# Patient Record
Sex: FEMALE | Race: WHITE | NOT HISPANIC OR LATINO | Employment: FULL TIME | URBAN - METROPOLITAN AREA
[De-identification: names, ages, dates, MRNs, and addresses within clinical notes are randomized per-mention and may not be internally consistent; named-entity substitution may affect disease eponyms.]

---

## 2024-01-02 DIAGNOSIS — Z12.11 COLON CANCER SCREENING: ICD-10-CM

## 2024-01-02 RX ORDER — POLYETHYLENE GLYCOL 3350, SODIUM SULFATE ANHYDROUS, SODIUM BICARBONATE, SODIUM CHLORIDE, POTASSIUM CHLORIDE 236; 22.74; 6.74; 5.86; 2.97 G/4L; G/4L; G/4L; G/4L; G/4L
4000 POWDER, FOR SOLUTION ORAL ONCE
Qty: 4000 ML | Refills: 0 | Status: SHIPPED | OUTPATIENT
Start: 2024-01-02 | End: 2024-01-02

## 2024-02-12 ENCOUNTER — OFFICE VISIT (OUTPATIENT)
Dept: GASTROENTEROLOGY | Facility: EXTERNAL LOCATION | Age: 49
End: 2024-02-12
Payer: COMMERCIAL

## 2024-02-12 DIAGNOSIS — Z85.038 PERSONAL HISTORY OF COLON CANCER: Primary | ICD-10-CM

## 2024-02-12 DIAGNOSIS — Z86.010 PERSONAL HISTORY OF COLONIC POLYPS: ICD-10-CM

## 2024-02-12 PROCEDURE — 45378 DIAGNOSTIC COLONOSCOPY: CPT | Performed by: INTERNAL MEDICINE

## 2024-08-19 ENCOUNTER — APPOINTMENT (OUTPATIENT)
Dept: PRIMARY CARE | Facility: CLINIC | Age: 49
End: 2024-08-19
Payer: COMMERCIAL

## 2024-08-19 VITALS
SYSTOLIC BLOOD PRESSURE: 120 MMHG | WEIGHT: 136 LBS | OXYGEN SATURATION: 100 % | HEART RATE: 90 BPM | BODY MASS INDEX: 23.71 KG/M2 | DIASTOLIC BLOOD PRESSURE: 80 MMHG

## 2024-08-19 DIAGNOSIS — C18.9 MALIGNANT NEOPLASM OF COLON, UNSPECIFIED PART OF COLON (MULTI): ICD-10-CM

## 2024-08-19 DIAGNOSIS — M25.552 ACUTE HIP PAIN, LEFT: ICD-10-CM

## 2024-08-19 DIAGNOSIS — Z12.31 VISIT FOR SCREENING MAMMOGRAM: ICD-10-CM

## 2024-08-19 DIAGNOSIS — R10.2 PELVIC PAIN: Primary | ICD-10-CM

## 2024-08-19 DIAGNOSIS — Z12.31 VISIT FOR SCREENING MAMMOGRAM: Primary | ICD-10-CM

## 2024-08-19 LAB
POC APPEARANCE, URINE: CLEAR
POC BILIRUBIN, URINE: NEGATIVE
POC BLOOD, URINE: NEGATIVE
POC COLOR, URINE: YELLOW
POC GLUCOSE, URINE: NEGATIVE MG/DL
POC KETONES, URINE: NEGATIVE MG/DL
POC LEUKOCYTES, URINE: NEGATIVE
POC NITRITE,URINE: NEGATIVE
POC PH, URINE: 7 PH
POC PROTEIN, URINE: NEGATIVE MG/DL
POC SPECIFIC GRAVITY, URINE: 1.01
POC UROBILINOGEN, URINE: 0.2 EU/DL

## 2024-08-19 PROCEDURE — 1036F TOBACCO NON-USER: CPT | Performed by: FAMILY MEDICINE

## 2024-08-19 PROCEDURE — 81002 URINALYSIS NONAUTO W/O SCOPE: CPT | Performed by: FAMILY MEDICINE

## 2024-08-19 PROCEDURE — 99214 OFFICE O/P EST MOD 30 MIN: CPT | Performed by: FAMILY MEDICINE

## 2024-08-19 RX ORDER — PREDNISONE 20 MG/1
TABLET ORAL
Qty: 9 TABLET | Refills: 0 | Status: SHIPPED | OUTPATIENT
Start: 2024-08-19 | End: 2024-08-25

## 2024-08-19 NOTE — PROGRESS NOTES
Subjective   Patient ID: Aminta Alba is a 49 y.o. female who presents for Hip Pain (Left hip pain since July-- no injury consistent pain, tried tylenol but did not help. ).    HPI    LEFT HIP PAIN:  Started early July (7/8)  Felt like her menstrual cycle was coming but never came.  Persistent achiness in her back and feeling deep into her left pelvis.  Pain comes and goes and is sharp at times.  No changes in bowel pattern or urinary symptoms.    Tried stretches and seemed to aggravate it more  Does not wake her from sleep but hurst when she gets up   No acute trauma    Persistent achiness but not reproducible.    PMH: colon ca  Reviewed previous CT scans with left sided ovarian/adnexal cysts    Review of Systems    Review of Systems negative except as noted in HPI and Chief complaint.     Objective             VITALS:  /80 (BP Location: Left arm, Patient Position: Sitting, BP Cuff Size: Adult)   Pulse 90   Wt 61.7 kg (136 lb)   SpO2 100%   BMI 23.71 kg/m²      Physical Exam  Constitutional:       General: She is not in acute distress.     Appearance: Normal appearance. She is not ill-appearing.   HENT:      Head: Normocephalic and atraumatic.   Neck:      Vascular: No carotid bruit.   Cardiovascular:      Rate and Rhythm: Normal rate and regular rhythm.      Pulses: Normal pulses.      Heart sounds: Normal heart sounds. No murmur heard.     No gallop.   Pulmonary:      Effort: Pulmonary effort is normal.      Breath sounds: Normal breath sounds. No wheezing, rhonchi or rales.   Musculoskeletal:         General: No swelling or tenderness. Normal range of motion.      Cervical back: Normal range of motion and neck supple. No rigidity or tenderness.      Comments: No paraspinal muscle tightness or tenderness  No pain with left hip flexion, extension or rotation.   Lymphadenopathy:      Cervical: No cervical adenopathy.   Skin:     General: Skin is warm and dry.   Neurological:      Mental Status: She is  alert.   Psychiatric:         Mood and Affect: Mood normal.         Behavior: Behavior normal.         Assessment/Plan   Problem List Items Addressed This Visit       Malignant neoplasm of colon, unspecified part of colon (Multi)     Stable- follow  up with GI for repeat colonoscopy as scheduled.  Call if any changes in bowel patterns or abdominal pain.          Other Visit Diagnoses       Pelvic pain    -  Primary    Checking pelvic US for possible persistent ovarian cyst.    Relevant Orders    US pelvis    POCT UA (nonautomated) manually resulted (Completed)    Acute hip pain, left        Trial of Prednisone taper for possible muscular inflammation.    Relevant Medications    predniSONE (Deltasone) 20 mg tablet    Visit for screening mammogram        Relevant Orders    BI mammo bilateral screening tomosynthesis        UA negative for hematuria - kidney stone less likely - consdier CT abdomen and pelvis if US negative.    FOLLOW UP  pending review of results , SOONER WITH ANY PROBLEMS OR CONCERNS.

## 2024-08-20 NOTE — ASSESSMENT & PLAN NOTE
Stable- follow  up with GI for repeat colonoscopy as scheduled.  Call if any changes in bowel patterns or abdominal pain.

## 2024-08-21 ENCOUNTER — HOSPITAL ENCOUNTER (OUTPATIENT)
Dept: RADIOLOGY | Facility: CLINIC | Age: 49
Discharge: HOME | End: 2024-08-21
Payer: COMMERCIAL

## 2024-08-21 DIAGNOSIS — R10.2 PELVIC AND PERINEAL PAIN: ICD-10-CM

## 2024-08-21 DIAGNOSIS — R10.2 PELVIC PAIN: ICD-10-CM

## 2024-08-21 PROCEDURE — 76856 US EXAM PELVIC COMPLETE: CPT

## 2024-08-21 PROCEDURE — 76830 TRANSVAGINAL US NON-OB: CPT

## 2024-08-22 ENCOUNTER — HOSPITAL ENCOUNTER (OUTPATIENT)
Dept: RADIOLOGY | Facility: CLINIC | Age: 49
Discharge: HOME | End: 2024-08-22
Payer: COMMERCIAL

## 2024-08-22 VITALS — HEIGHT: 64 IN | BODY MASS INDEX: 23.05 KG/M2 | WEIGHT: 135 LBS

## 2024-08-22 DIAGNOSIS — Z12.31 ENCOUNTER FOR SCREENING MAMMOGRAM FOR MALIGNANT NEOPLASM OF BREAST: ICD-10-CM

## 2024-08-22 DIAGNOSIS — Z12.31 VISIT FOR SCREENING MAMMOGRAM: ICD-10-CM

## 2024-08-22 PROCEDURE — 77067 SCR MAMMO BI INCL CAD: CPT

## 2024-08-26 ENCOUNTER — TELEPHONE (OUTPATIENT)
Dept: PRIMARY CARE | Facility: CLINIC | Age: 49
End: 2024-08-26
Payer: COMMERCIAL

## 2024-08-26 ENCOUNTER — PATIENT MESSAGE (OUTPATIENT)
Dept: PRIMARY CARE | Facility: CLINIC | Age: 49
End: 2024-08-26
Payer: COMMERCIAL

## 2024-08-26 DIAGNOSIS — M25.552 ACUTE HIP PAIN, LEFT: ICD-10-CM

## 2024-08-26 DIAGNOSIS — R10.2 PELVIC PAIN: Primary | ICD-10-CM

## 2024-08-26 NOTE — TELEPHONE ENCOUNTER
PT CALLED TODAY STATING SHE SAW DR CARPENTER ON 8.19.24 FOR PELVIC PAIN, SHE PRESCRIBED HER PREDNISONE AND AN ULTRASOUND. SHE SAID THE PAIN IS STILL THERE AND SHE JUST ISNT SURE WHAT TO DO TO HELP? PLEASE ADVISE.

## 2024-08-27 RX ORDER — TIZANIDINE 2 MG/1
2 TABLET ORAL EVERY 8 HOURS PRN
Qty: 30 TABLET | Refills: 0 | Status: SHIPPED | OUTPATIENT
Start: 2024-08-27 | End: 2024-09-06

## 2024-08-30 ENCOUNTER — APPOINTMENT (OUTPATIENT)
Dept: PRIMARY CARE | Facility: CLINIC | Age: 49
End: 2024-08-30
Payer: COMMERCIAL

## 2024-09-10 ENCOUNTER — HOSPITAL ENCOUNTER (OUTPATIENT)
Dept: RADIOLOGY | Facility: CLINIC | Age: 49
Discharge: HOME | End: 2024-09-10
Payer: COMMERCIAL

## 2024-09-10 DIAGNOSIS — R10.2 PELVIC PAIN: ICD-10-CM

## 2024-09-10 PROCEDURE — 74177 CT ABD & PELVIS W/CONTRAST: CPT | Performed by: RADIOLOGY

## 2024-09-10 PROCEDURE — 74177 CT ABD & PELVIS W/CONTRAST: CPT

## 2024-09-10 PROCEDURE — 2550000001 HC RX 255 CONTRASTS: Performed by: FAMILY MEDICINE

## 2024-09-18 ENCOUNTER — HOSPITAL ENCOUNTER (OUTPATIENT)
Dept: RADIOLOGY | Facility: CLINIC | Age: 49
Discharge: HOME | End: 2024-09-18
Payer: COMMERCIAL

## 2024-09-18 ENCOUNTER — APPOINTMENT (OUTPATIENT)
Dept: PRIMARY CARE | Facility: CLINIC | Age: 49
End: 2024-09-18
Payer: COMMERCIAL

## 2024-09-18 VITALS
SYSTOLIC BLOOD PRESSURE: 98 MMHG | OXYGEN SATURATION: 98 % | HEART RATE: 83 BPM | DIASTOLIC BLOOD PRESSURE: 64 MMHG | WEIGHT: 133 LBS | BODY MASS INDEX: 23.19 KG/M2

## 2024-09-18 DIAGNOSIS — N83.8 OVARIAN MASS, LEFT: ICD-10-CM

## 2024-09-18 DIAGNOSIS — M25.552 ACUTE HIP PAIN, LEFT: ICD-10-CM

## 2024-09-18 DIAGNOSIS — R10.2 PELVIC PAIN: Primary | ICD-10-CM

## 2024-09-18 DIAGNOSIS — R10.2 PELVIC PAIN: ICD-10-CM

## 2024-09-18 PROCEDURE — 99213 OFFICE O/P EST LOW 20 MIN: CPT | Performed by: FAMILY MEDICINE

## 2024-09-18 PROCEDURE — 73502 X-RAY EXAM HIP UNI 2-3 VIEWS: CPT | Mod: LEFT SIDE | Performed by: RADIOLOGY

## 2024-09-18 PROCEDURE — 1036F TOBACCO NON-USER: CPT | Performed by: FAMILY MEDICINE

## 2024-09-18 PROCEDURE — 73502 X-RAY EXAM HIP UNI 2-3 VIEWS: CPT | Mod: LT

## 2024-09-18 RX ORDER — TIZANIDINE 4 MG/1
4 TABLET ORAL NIGHTLY
Qty: 30 TABLET | Refills: 2 | Status: SHIPPED | OUTPATIENT
Start: 2024-09-18 | End: 2024-12-17

## 2024-09-18 RX ORDER — MELOXICAM 15 MG/1
15 TABLET ORAL DAILY
Qty: 30 TABLET | Refills: 11 | Status: SHIPPED | OUTPATIENT
Start: 2024-09-18 | End: 2025-09-18

## 2024-09-18 ASSESSMENT — ENCOUNTER SYMPTOMS
DEPRESSION: 0
OCCASIONAL FEELINGS OF UNSTEADINESS: 0
LOSS OF SENSATION IN FEET: 0

## 2024-09-18 ASSESSMENT — PATIENT HEALTH QUESTIONNAIRE - PHQ9
1. LITTLE INTEREST OR PLEASURE IN DOING THINGS: NOT AT ALL
2. FEELING DOWN, DEPRESSED OR HOPELESS: NOT AT ALL
SUM OF ALL RESPONSES TO PHQ9 QUESTIONS 1 AND 2: 0

## 2024-09-18 NOTE — PROGRESS NOTES
Subjective   Patient ID: Aminta Alba is a 49 y.o. female who presents for Follow-up.    HPI     Review of Systems   All other systems reviewed and are negative.      Objective   BP 98/64   Pulse 83   Wt 60.3 kg (133 lb)   LMP 08/04/2024   SpO2 98%   BMI 23.19 kg/m²     Physical Exam  Constitutional:       Appearance: Normal appearance.   HENT:      Head: Normocephalic and atraumatic.      Right Ear: Tympanic membrane, ear canal and external ear normal.      Left Ear: Tympanic membrane, ear canal and external ear normal.      Nose: Nose normal.      Mouth/Throat:      Mouth: Mucous membranes are moist.      Pharynx: Oropharynx is clear.   Eyes:      Extraocular Movements: Extraocular movements intact.      Conjunctiva/sclera: Conjunctivae normal.      Pupils: Pupils are equal, round, and reactive to light.   Cardiovascular:      Rate and Rhythm: Normal rate and regular rhythm.      Pulses: Normal pulses.      Heart sounds: Normal heart sounds.   Pulmonary:      Effort: Pulmonary effort is normal.      Breath sounds: Normal breath sounds.   Abdominal:      General: Abdomen is flat. Bowel sounds are normal.      Palpations: Abdomen is soft.   Musculoskeletal:         General: Normal range of motion.      Cervical back: Normal range of motion and neck supple.   Skin:     General: Skin is warm and dry.      Capillary Refill: Capillary refill takes less than 2 seconds.   Neurological:      General: No focal deficit present.      Mental Status: She is alert and oriented to person, place, and time.   Psychiatric:         Mood and Affect: Mood normal.         Behavior: Behavior normal.         Thought Content: Thought content normal.         Assessment/Plan   Diagnoses and all orders for this visit:  Pelvic pain  -     Referral to Obstetrics / Gynecology; Future  -     XR hip left with pelvis when performed 2 or 3 views; Future  -     TSH with reflex to Free T4 if abnormal; Future  -     Lipid Panel; Future  -      Comprehensive Metabolic Panel; Future  -     CBC and Auto Differential; Future  -     meloxicam (Mobic) 15 mg tablet; Take 1 tablet (15 mg) by mouth once daily.  Ovarian mass, left  -     ; Future  -     meloxicam (Mobic) 15 mg tablet; Take 1 tablet (15 mg) by mouth once daily.  Acute hip pain, left  -     tiZANidine (Zanaflex) 4 mg tablet; Take 1 tablet (4 mg) by mouth once daily at bedtime.

## 2024-09-19 ENCOUNTER — LAB (OUTPATIENT)
Dept: LAB | Facility: LAB | Age: 49
End: 2024-09-19
Payer: COMMERCIAL

## 2024-09-19 ENCOUNTER — TELEPHONE (OUTPATIENT)
Dept: OBSTETRICS AND GYNECOLOGY | Facility: CLINIC | Age: 49
End: 2024-09-19

## 2024-09-19 DIAGNOSIS — N83.8 OVARIAN MASS, LEFT: ICD-10-CM

## 2024-09-19 DIAGNOSIS — R10.2 PELVIC PAIN: ICD-10-CM

## 2024-09-19 LAB
ALBUMIN SERPL BCP-MCNC: 4.5 G/DL (ref 3.4–5)
ALP SERPL-CCNC: 35 U/L (ref 33–110)
ALT SERPL W P-5'-P-CCNC: 12 U/L (ref 7–45)
ANION GAP SERPL CALC-SCNC: 10 MMOL/L (ref 10–20)
AST SERPL W P-5'-P-CCNC: 14 U/L (ref 9–39)
BASOPHILS # BLD AUTO: 0.03 X10*3/UL (ref 0–0.1)
BASOPHILS NFR BLD AUTO: 0.8 %
BILIRUB SERPL-MCNC: 1.6 MG/DL (ref 0–1.2)
BUN SERPL-MCNC: 11 MG/DL (ref 6–23)
CALCIUM SERPL-MCNC: 9.8 MG/DL (ref 8.6–10.6)
CANCER AG125 SERPL-ACNC: 12 U/ML (ref 0–30.2)
CHLORIDE SERPL-SCNC: 104 MMOL/L (ref 98–107)
CHOLEST SERPL-MCNC: 206 MG/DL (ref 0–199)
CHOLESTEROL/HDL RATIO: 2.6
CO2 SERPL-SCNC: 26 MMOL/L (ref 21–32)
CREAT SERPL-MCNC: 0.86 MG/DL (ref 0.5–1.05)
EGFRCR SERPLBLD CKD-EPI 2021: 83 ML/MIN/1.73M*2
EOSINOPHIL # BLD AUTO: 0.03 X10*3/UL (ref 0–0.7)
EOSINOPHIL NFR BLD AUTO: 0.8 %
ERYTHROCYTE [DISTWIDTH] IN BLOOD BY AUTOMATED COUNT: 12.4 % (ref 11.5–14.5)
GLUCOSE SERPL-MCNC: 95 MG/DL (ref 74–99)
HCT VFR BLD AUTO: 38.3 % (ref 36–46)
HDLC SERPL-MCNC: 78.1 MG/DL
HGB BLD-MCNC: 12.4 G/DL (ref 12–16)
IMM GRANULOCYTES # BLD AUTO: 0.01 X10*3/UL (ref 0–0.7)
IMM GRANULOCYTES NFR BLD AUTO: 0.3 % (ref 0–0.9)
LDLC SERPL CALC-MCNC: 118 MG/DL
LYMPHOCYTES # BLD AUTO: 1.53 X10*3/UL (ref 1.2–4.8)
LYMPHOCYTES NFR BLD AUTO: 40.1 %
MCH RBC QN AUTO: 30.4 PG (ref 26–34)
MCHC RBC AUTO-ENTMCNC: 32.4 G/DL (ref 32–36)
MCV RBC AUTO: 94 FL (ref 80–100)
MONOCYTES # BLD AUTO: 0.3 X10*3/UL (ref 0.1–1)
MONOCYTES NFR BLD AUTO: 7.9 %
NEUTROPHILS # BLD AUTO: 1.92 X10*3/UL (ref 1.2–7.7)
NEUTROPHILS NFR BLD AUTO: 50.1 %
NON HDL CHOLESTEROL: 128 MG/DL (ref 0–149)
NRBC BLD-RTO: 0 /100 WBCS (ref 0–0)
PLATELET # BLD AUTO: 241 X10*3/UL (ref 150–450)
POTASSIUM SERPL-SCNC: 4 MMOL/L (ref 3.5–5.3)
PROT SERPL-MCNC: 7 G/DL (ref 6.4–8.2)
RBC # BLD AUTO: 4.08 X10*6/UL (ref 4–5.2)
SODIUM SERPL-SCNC: 136 MMOL/L (ref 136–145)
TRIGL SERPL-MCNC: 49 MG/DL (ref 0–149)
TSH SERPL-ACNC: 2.01 MIU/L (ref 0.44–3.98)
VLDL: 10 MG/DL (ref 0–40)
WBC # BLD AUTO: 3.8 X10*3/UL (ref 4.4–11.3)

## 2024-09-19 PROCEDURE — 85025 COMPLETE CBC W/AUTO DIFF WBC: CPT

## 2024-09-19 PROCEDURE — 80061 LIPID PANEL: CPT

## 2024-09-19 PROCEDURE — 84443 ASSAY THYROID STIM HORMONE: CPT

## 2024-09-19 PROCEDURE — 80053 COMPREHEN METABOLIC PANEL: CPT

## 2024-09-19 PROCEDURE — 36415 COLL VENOUS BLD VENIPUNCTURE: CPT

## 2024-09-19 PROCEDURE — 86304 IMMUNOASSAY TUMOR CA 125: CPT

## 2024-09-19 NOTE — TELEPHONE ENCOUNTER
Patient called stating that she has pelvic pain and ovarian cysts.  Was referred to [facility] via Dr. Ashlyn Geller.  Please advise,

## 2024-09-30 ENCOUNTER — APPOINTMENT (OUTPATIENT)
Dept: PRIMARY CARE | Facility: CLINIC | Age: 49
End: 2024-09-30
Payer: COMMERCIAL

## 2024-10-04 PROBLEM — N83.8 COMPLEX CYST OF UTERINE ADNEXA: Status: ACTIVE | Noted: 2024-10-04

## 2024-10-04 PROBLEM — N94.89 ENDOMETRIAL MASS: Status: ACTIVE | Noted: 2024-10-04

## 2024-10-04 PROBLEM — R10.2 PELVIC PAIN IN FEMALE: Status: ACTIVE | Noted: 2024-10-04

## 2024-10-05 NOTE — PROGRESS NOTES
Subjective   Patient ID: Aminta Alba is a 49 y.o. female who presents for New Patient Visit (Lower abdominal pain x months).  She presents as a new patient for gyn visit. She was referred by Dr. Olsen for pelvic pain. Last two progress notes were reviewed in preparation for today's visit.   8/12/2020 pap and HPV returned negative. She desires annual exam along  with evaluation of pelvic pain and imaging.    She has had complaint of a deep pain in the left hip/pelvis since July 2024. She describes this as a deep aching similar to what she has associated with menses. This pain comes and goes and can be sharp at times. There is no associated bowel or bladder issues. This pain can be exacerbated by stretching and seems to be worse when getting up from bed. She had pain with bending, sitting, activity. The pain reminded her of pain/cramping with menses but was clearly related to activity. The initial pain lasted for one month.  Initial thought was that this could be musculoskeletal in origin. She was prescribed a course of muscle relaxer and prednisone with no improvement in the pain. The pain has gradually improved since July but she still feels something is not right. There continues to be a dull discomfort and sensation of something pressing on her hip/pelvis. It is not as related to activity now.    Menses are monthly with heavy flow and with clots. Tranexamic acid has helped with menses. She has not noted any relation of pain with menses. She denies any recent injury or fall. She denies pain with intercourse. She denies any relation of pain to using the bathroom or eating.     Pelvic ultrasound was performed on 8/21/2024 and was significant for an endometrial mass and a 2.2 cm left adnexal complex cyst:  UTERUS:  The uterus measures  6.8 x 5.7 x 9.8. Normal myometrium.  ENDOMETRIUM:  Endometrial thickness 1.2 cm. Endometrial hypoechoic nodule slightly disrupting the endometrial stripe measuring 1.1 cm.  RIGHT  ADNEXA:  The right ovary measures 1.6 x 2.0 x 2.4 and demonstrates normal flow. No gross right adnexal masses are seen, no hydrosalpinx.  LEFT ADNEXA:  The left ovary measures 3.0 x 1.6 x 2.9 and demonstrates normal flow. Complex follicle/corpus luteum measuring 2.2 cm    CT was performed on 9/21/2024:  IMPRESSION:  1. No acute abdominopelvic pathology.  2. Redemonstration of rounded likely either submucosal fibroid versus polyp extending superiorly from the inferior aspect of the myometrium into the endometrial cavity. Similar uterine and bilateral adnexal heterogeneity with multiple adnexal cysts which, all of which are better appreciated on recent ultrasound of the pelvis from 08/21/2024.    Within the body of the CT report the radiologist also made note of dilated pelvic vasculature.    Xray of the hip was performed on 9/18/2024 and returned unremarkable.   CBC returned without anemia. CA-125 returned normal at 12.0.      Abdominal Pain  This is a chronic problem. The current episode started more than 1 month ago. The onset quality is sudden. The problem occurs daily. The most recent episode lasted 4 months. The problem has been waxing and waning. The pain is located in the left flank. The pain is at a severity of 3/10. The quality of the pain is aching, dull and a sensation of fullness. The abdominal pain radiates to the pelvis. Associated symptoms include myalgias. Pertinent negatives include no anorexia, arthralgias, belching, constipation, diarrhea, dysuria, fever, flatus, frequency, headaches, hematochezia, hematuria, melena, nausea, vomiting or weight loss. The pain is aggravated by being still, certain positions and movement. The pain is relieved by Activity and movement. Prior diagnostic workup includes CT scan and ultrasound.         Review of Systems   Constitutional:  Negative for activity change, fever and weight loss.   HENT:  Negative for congestion.    Respiratory:  Negative for apnea and cough.     Cardiovascular:  Negative for chest pain.   Gastrointestinal:  Positive for abdominal pain. Negative for anorexia, constipation, diarrhea, flatus, hematochezia, melena, nausea and vomiting.   Genitourinary:  Negative for dysuria, frequency, hematuria and vaginal pain.   Musculoskeletal:  Positive for myalgias. Negative for arthralgias and joint swelling.   Neurological:  Negative for dizziness and headaches.   Psychiatric/Behavioral:  Negative for agitation.        Past Medical History:   Diagnosis Date    Anemia     Malignant neoplasm of ascending colon (Multi) 02/06/2017    Malignant neoplasm of ascending colon      Past Surgical History:   Procedure Laterality Date    APPENDECTOMY  02/02/2015    Appendectomy    DILATION AND CURETTAGE OF UTERUS  02/02/2015    Dilation And Curettage    OTHER SURGICAL HISTORY  04/13/2016    Laparo Part Colectomy W/ Removal Terminal Ileum, Ileocolost    TONSILLECTOMY  02/02/2015    Tonsillectomy      No Known Allergies   Current Outpatient Medications on File Prior to Visit   Medication Sig Dispense Refill    meloxicam (Mobic) 15 mg tablet Take 1 tablet (15 mg) by mouth once daily. 30 tablet 11    tiZANidine (Zanaflex) 4 mg tablet Take 1 tablet (4 mg) by mouth once daily at bedtime. 30 tablet 2    cetirizine (ZyrTEC) 10 mg tablet Take 1 tablet (10 mg) by mouth once daily.       No current facility-administered medications on file prior to visit.        Objective   Physical Exam  Constitutional:       Appearance: Normal appearance.   Neck:      Thyroid: No thyromegaly.   Cardiovascular:      Rate and Rhythm: Normal rate and regular rhythm.      Heart sounds: Normal heart sounds.   Pulmonary:      Effort: Pulmonary effort is normal.      Breath sounds: Normal breath sounds.   Chest:      Chest wall: No mass.   Breasts:     Right: Normal. No inverted nipple, mass, nipple discharge or skin change.      Left: Normal. No inverted nipple, mass, nipple discharge or skin change.    Abdominal:      General: There is no distension.      Palpations: Abdomen is soft. There is no mass.      Tenderness: There is no abdominal tenderness.   Genitourinary:     General: Normal vulva.      Exam position: Lithotomy position.      Labia:         Right: No rash.         Left: No rash.       Urethra: No urethral lesion.      Vagina: Normal. No lesions.      Cervix: No friability or lesion.      Uterus: Normal. Not enlarged and not tender.       Adnexa: Right adnexa normal and left adnexa normal.        Right: No mass or tenderness.          Left: No mass or tenderness.        Comments: No pelvic tenderness is noted on exam. Pain is not reproduced with palpation of pelvic floor muscles.   Musculoskeletal:         General: No deformity.      Cervical back: Neck supple.   Lymphadenopathy:      Cervical: No cervical adenopathy.   Skin:     General: Skin is warm and dry.      Findings: No rash.   Neurological:      General: No focal deficit present.      Mental Status: She is alert.   Psychiatric:         Mood and Affect: Mood normal.         Behavior: Behavior is cooperative.         Thought Content: Thought content normal.           Problem List Items Addressed This Visit       Well woman exam with routine gynecological exam    Overview     8/12/2020 pap and HPV were negative.          Current Assessment & Plan     Pap is sent with HPV.  Mammogram is up to date.  Regular exercise and attaining/maintaining a healthy weight is encouraged.   Adequate calcium intake with diet or supplements is encouraged.    We will notify of any abnormal results.          Pelvic pain in female - Primary    Overview     Left deep pelvic pain since 7/2024.   Pelvic ultrasound 8/21/2024 returned with normal sized uterus without evidence of fibroids, endometrial thickness 1.2 cm and endometrial hypoechoic nodule slightly disrupting the endometrial stripe measuring 1.1 cm. Right ovary had normal appearance and left ovary had a  complex follicle/corpus luteum measuring 2.2 cm.   CT 9/10/2024 was significant for dilated pelvic vasculature.  Pelvic pain is not reproduced with palpation of pelvic floor muscles or bimanual exam.             Current Assessment & Plan     Will repeat pelvic ultrasound to follow adnexal cyst. She will also follow up with PCP to evaluate for other causes of pain.         Menorrhagia with regular cycle    Overview     Heavy menses with clots along with endometrial mass on imaging.  She agrees to endometrial biopsy at follow up.         Current Assessment & Plan     We did briefly discuss treatment options of continuing Tranexamic acid, IUD, hysterectomy. She will consider and review at follow up.         Endometrial mass    Overview     Ultrasound was performed 8/12/2024 due to left pelvic pain. Uterus measured 6.8 x 5.7 x 9.8 cm with 1.2 cm endometrial thickness and a 1.1 cm hypoechoic endometrial lesion noted.  Endometrial biopsy is planned for at follow up visit.          Current Assessment & Plan     Will repeat ultrasound imaging and schedule endometrial biopsy. We discussed potential future need for hysteroscopy if abnormal tissue is found.          Relevant Orders    US PELVIS TRANSABDOMINAL WITH TRANSVAGINAL    Complex cyst of uterine adnexa    Overview     8/21/2024 ultrasound shows a left complex follicle/corpus luteum measuring 2.2 cm. CA-125 returned normal at 12.          Current Assessment & Plan     Follow up ultrasound is ordered to assess ovary.          Relevant Orders    US PELVIS TRANSABDOMINAL WITH TRANSVAGINAL

## 2024-10-07 ASSESSMENT — ENCOUNTER SYMPTOMS
DYSURIA: 0
FLATUS: 0
BELCHING: 0
HEMATOCHEZIA: 0
MYALGIAS: 1
FEVER: 0
ANOREXIA: 0
DIARRHEA: 0
FREQUENCY: 0
CONSTIPATION: 0
NAUSEA: 0
ARTHRALGIAS: 0
WEIGHT LOSS: 0
HEMATURIA: 0
ABDOMINAL PAIN: 1
HEADACHES: 0
VOMITING: 0

## 2024-10-09 ENCOUNTER — APPOINTMENT (OUTPATIENT)
Dept: OBSTETRICS AND GYNECOLOGY | Facility: CLINIC | Age: 49
End: 2024-10-09
Payer: COMMERCIAL

## 2024-10-09 VITALS
WEIGHT: 135 LBS | DIASTOLIC BLOOD PRESSURE: 82 MMHG | SYSTOLIC BLOOD PRESSURE: 130 MMHG | HEIGHT: 63 IN | BODY MASS INDEX: 23.92 KG/M2

## 2024-10-09 DIAGNOSIS — R10.2 PELVIC PAIN IN FEMALE: Primary | ICD-10-CM

## 2024-10-09 DIAGNOSIS — N83.8 COMPLEX CYST OF UTERINE ADNEXA: ICD-10-CM

## 2024-10-09 DIAGNOSIS — Z01.419 WELL WOMAN EXAM WITH ROUTINE GYNECOLOGICAL EXAM: ICD-10-CM

## 2024-10-09 DIAGNOSIS — N92.0 MENORRHAGIA WITH REGULAR CYCLE: ICD-10-CM

## 2024-10-09 DIAGNOSIS — N94.89 ENDOMETRIAL MASS: ICD-10-CM

## 2024-10-09 PROCEDURE — 87624 HPV HI-RISK TYP POOLED RSLT: CPT

## 2024-10-09 PROCEDURE — 99204 OFFICE O/P NEW MOD 45 MIN: CPT | Performed by: OBSTETRICS & GYNECOLOGY

## 2024-10-09 PROCEDURE — 3008F BODY MASS INDEX DOCD: CPT | Performed by: OBSTETRICS & GYNECOLOGY

## 2024-10-09 PROCEDURE — 1036F TOBACCO NON-USER: CPT | Performed by: OBSTETRICS & GYNECOLOGY

## 2024-10-09 PROCEDURE — 99386 PREV VISIT NEW AGE 40-64: CPT | Performed by: OBSTETRICS & GYNECOLOGY

## 2024-10-09 RX ORDER — CETIRIZINE HYDROCHLORIDE 10 MG/1
10 TABLET ORAL DAILY
COMMUNITY

## 2024-10-09 ASSESSMENT — ENCOUNTER SYMPTOMS
FEVER: 0
WEIGHT LOSS: 0
DYSURIA: 0
HEADACHES: 0
HEMATOCHEZIA: 0
MYALGIAS: 1
FREQUENCY: 0
FLATUS: 0
HEMATURIA: 0
AGITATION: 0
COUGH: 0
JOINT SWELLING: 0
ACTIVITY CHANGE: 0
DIARRHEA: 0
NAUSEA: 0
VOMITING: 0
ABDOMINAL PAIN: 1
APNEA: 0
ARTHRALGIAS: 0
ANOREXIA: 0
BELCHING: 0
CONSTIPATION: 0
DIZZINESS: 0

## 2024-10-09 NOTE — ASSESSMENT & PLAN NOTE
Pap is sent with HPV.  Mammogram is up to date.  Regular exercise and attaining/maintaining a healthy weight is encouraged.   Adequate calcium intake with diet or supplements is encouraged.    We will notify of any abnormal results.

## 2024-10-09 NOTE — ASSESSMENT & PLAN NOTE
Will repeat pelvic ultrasound to follow adnexal cyst. She will also follow up with PCP to evaluate for other causes of pain.

## 2024-10-09 NOTE — ASSESSMENT & PLAN NOTE
Will repeat ultrasound imaging and schedule endometrial biopsy. We discussed potential future need for hysteroscopy if abnormal tissue is found.

## 2024-10-09 NOTE — LETTER
October 9, 2024     Ashlyn Olsen DO  [address], [address]  [facility] [address]    Patient: Aminta Alba   YOB: 1975   Date of Visit: 10/9/2024       Dear Dr. Ashlyn Olsen DO:    Thank you for referring Aminta Alba to me for evaluation. Below are my notes for this consultation.  If you have questions, please do not hesitate to call me. I look forward to following your patient along with you.       Sincerely,     Anne Marie Dixon MD      CC: No Recipients  ______________________________________________________________________________________    Subjective  Patient ID: Aminta Alba is a 49 y.o. female who presents for New Patient Visit (Lower abdominal pain x months).  She presents as a new patient for gyn visit. She was referred by Dr. Olsen for pelvic pain. Last two progress notes were reviewed in preparation for today's visit.   8/12/2020 pap and HPV returned negative. She desires annual exam along  with evaluation of pelvic pain and imaging.    She has had complaint of a deep pain in the left hip/pelvis since July 2024. She describes this as a deep aching similar to what she has associated with menses. This pain comes and goes and can be sharp at times. There is no associated bowel or bladder issues. This pain can be exacerbated by stretching and seems to be worse when getting up from bed. She had pain with bending, sitting, activity. The pain reminded her of pain/cramping with menses but was clearly related to activity. The initial pain lasted for one month.  Initial thought was that this could be musculoskeletal in origin. She was prescribed a course of muscle relaxer and prednisone with no improvement in the pain. The pain has gradually improved since July but she still feels something is not right. There continues to be a dull discomfort and sensation of something pressing on her hip/pelvis. It is not as related to activity now.    Menses are monthly  with heavy flow and with clots. Tranexamic acid has helped with menses. She has not noted any relation of pain with menses. She denies any recent injury or fall. She denies pain with intercourse. She denies any relation of pain to using the bathroom or eating.     Pelvic ultrasound was performed on 8/21/2024 and was significant for an endometrial mass and a 2.2 cm left adnexal complex cyst:  UTERUS:  The uterus measures  6.8 x 5.7 x 9.8. Normal myometrium.  ENDOMETRIUM:  Endometrial thickness 1.2 cm. Endometrial hypoechoic nodule slightly disrupting the endometrial stripe measuring 1.1 cm.  RIGHT ADNEXA:  The right ovary measures 1.6 x 2.0 x 2.4 and demonstrates normal flow. No gross right adnexal masses are seen, no hydrosalpinx.  LEFT ADNEXA:  The left ovary measures 3.0 x 1.6 x 2.9 and demonstrates normal flow. Complex follicle/corpus luteum measuring 2.2 cm    CT was performed on 9/21/2024:  IMPRESSION:  1. No acute abdominopelvic pathology.  2. Redemonstration of rounded likely either submucosal fibroid versus polyp extending superiorly from the inferior aspect of the myometrium into the endometrial cavity. Similar uterine and bilateral adnexal heterogeneity with multiple adnexal cysts which, all of which are better appreciated on recent ultrasound of the pelvis from 08/21/2024.    Within the body of the CT report the radiologist also made note of dilated pelvic vasculature.    Xray of the hip was performed on 9/18/2024 and returned unremarkable.   CBC returned without anemia. CA-125 returned normal at 12.0.      Abdominal Pain  This is a chronic problem. The current episode started more than 1 month ago. The onset quality is sudden. The problem occurs daily. The most recent episode lasted 4 months. The problem has been waxing and waning. The pain is located in the left flank. The pain is at a severity of 3/10. The quality of the pain is aching, dull and a sensation of fullness. The abdominal pain radiates to  the pelvis. Associated symptoms include myalgias. Pertinent negatives include no anorexia, arthralgias, belching, constipation, diarrhea, dysuria, fever, flatus, frequency, headaches, hematochezia, hematuria, melena, nausea, vomiting or weight loss. The pain is aggravated by being still, certain positions and movement. The pain is relieved by Activity and movement. Prior diagnostic workup includes CT scan and ultrasound.         Review of Systems   Constitutional:  Negative for activity change, fever and weight loss.   HENT:  Negative for congestion.    Respiratory:  Negative for apnea and cough.    Cardiovascular:  Negative for chest pain.   Gastrointestinal:  Positive for abdominal pain. Negative for anorexia, constipation, diarrhea, flatus, hematochezia, melena, nausea and vomiting.   Genitourinary:  Negative for dysuria, frequency, hematuria and vaginal pain.   Musculoskeletal:  Positive for myalgias. Negative for arthralgias and joint swelling.   Neurological:  Negative for dizziness and headaches.   Psychiatric/Behavioral:  Negative for agitation.        Past Medical History:   Diagnosis Date   • Anemia    • Malignant neoplasm of ascending colon (Multi) 02/06/2017    Malignant neoplasm of ascending colon      Past Surgical History:   Procedure Laterality Date   • APPENDECTOMY  02/02/2015    Appendectomy   • DILATION AND CURETTAGE OF UTERUS  02/02/2015    Dilation And Curettage   • OTHER SURGICAL HISTORY  04/13/2016    Laparo Part Colectomy W/ Removal Terminal Ileum, Ileocolost   • TONSILLECTOMY  02/02/2015    Tonsillectomy      No Known Allergies   Current Outpatient Medications on File Prior to Visit   Medication Sig Dispense Refill   • meloxicam (Mobic) 15 mg tablet Take 1 tablet (15 mg) by mouth once daily. 30 tablet 11   • tiZANidine (Zanaflex) 4 mg tablet Take 1 tablet (4 mg) by mouth once daily at bedtime. 30 tablet 2   • cetirizine (ZyrTEC) 10 mg tablet Take 1 tablet (10 mg) by mouth once daily.        No current facility-administered medications on file prior to visit.        Objective  Physical Exam  Constitutional:       Appearance: Normal appearance.   Neck:      Thyroid: No thyromegaly.   Cardiovascular:      Rate and Rhythm: Normal rate and regular rhythm.      Heart sounds: Normal heart sounds.   Pulmonary:      Effort: Pulmonary effort is normal.      Breath sounds: Normal breath sounds.   Chest:      Chest wall: No mass.   Breasts:     Right: Normal. No inverted nipple, mass, nipple discharge or skin change.      Left: Normal. No inverted nipple, mass, nipple discharge or skin change.   Abdominal:      General: There is no distension.      Palpations: Abdomen is soft. There is no mass.      Tenderness: There is no abdominal tenderness.   Genitourinary:     General: Normal vulva.      Exam position: Lithotomy position.      Labia:         Right: No rash.         Left: No rash.       Urethra: No urethral lesion.      Vagina: Normal. No lesions.      Cervix: No friability or lesion.      Uterus: Normal. Not enlarged and not tender.       Adnexa: Right adnexa normal and left adnexa normal.        Right: No mass or tenderness.          Left: No mass or tenderness.        Comments: No pelvic tenderness is noted on exam. Pain is not reproduced with palpation of pelvic floor muscles.   Musculoskeletal:         General: No deformity.      Cervical back: Neck supple.   Lymphadenopathy:      Cervical: No cervical adenopathy.   Skin:     General: Skin is warm and dry.      Findings: No rash.   Neurological:      General: No focal deficit present.      Mental Status: She is alert.   Psychiatric:         Mood and Affect: Mood normal.         Behavior: Behavior is cooperative.         Thought Content: Thought content normal.           Problem List Items Addressed This Visit       Well woman exam with routine gynecological exam    Overview     8/12/2020 pap and HPV were negative.          Current Assessment & Plan      Pap is sent with HPV.  Mammogram is up to date.  Regular exercise and attaining/maintaining a healthy weight is encouraged.   Adequate calcium intake with diet or supplements is encouraged.    We will notify of any abnormal results.          Pelvic pain in female - Primary    Overview     Left deep pelvic pain since 7/2024.   Pelvic ultrasound 8/21/2024 returned with normal sized uterus without evidence of fibroids, endometrial thickness 1.2 cm and endometrial hypoechoic nodule slightly disrupting the endometrial stripe measuring 1.1 cm. Right ovary had normal appearance and left ovary had a complex follicle/corpus luteum measuring 2.2 cm.   CT 9/10/2024 was significant for dilated pelvic vasculature.  Pelvic pain is not reproduced with palpation of pelvic floor muscles or bimanual exam.             Current Assessment & Plan     Will repeat pelvic ultrasound to follow adnexal cyst. She will also follow up with PCP to evaluate for other causes of pain.         Menorrhagia with regular cycle    Overview     Heavy menses with clots along with endometrial mass on imaging.  She agrees to endometrial biopsy at follow up.         Current Assessment & Plan     We did briefly discuss treatment options of continuing Tranexamic acid, IUD, hysterectomy. She will consider and review at follow up.         Endometrial mass    Overview     Ultrasound was performed 8/12/2024 due to left pelvic pain. Uterus measured 6.8 x 5.7 x 9.8 cm with 1.2 cm endometrial thickness and a 1.1 cm hypoechoic endometrial lesion noted.  Endometrial biopsy is planned for at follow up visit.          Current Assessment & Plan     Will repeat ultrasound imaging and schedule endometrial biopsy. We discussed potential future need for hysteroscopy if abnormal tissue is found.          Relevant Orders    US PELVIS TRANSABDOMINAL WITH TRANSVAGINAL    Complex cyst of uterine adnexa    Overview     8/21/2024 ultrasound shows a left complex follicle/corpus  luteum measuring 2.2 cm. CA-125 returned normal at 12.          Current Assessment & Plan     Follow up ultrasound is ordered to assess ovary.          Relevant Orders    US PELVIS TRANSABDOMINAL WITH TRANSVAGINAL

## 2024-10-09 NOTE — ASSESSMENT & PLAN NOTE
We did briefly discuss treatment options of continuing Tranexamic acid, IUD, hysterectomy. She will consider and review at follow up.

## 2024-10-15 ENCOUNTER — HOSPITAL ENCOUNTER (OUTPATIENT)
Dept: RADIOLOGY | Facility: CLINIC | Age: 49
Discharge: HOME | End: 2024-10-15
Payer: COMMERCIAL

## 2024-10-15 DIAGNOSIS — N83.8 COMPLEX CYST OF UTERINE ADNEXA: ICD-10-CM

## 2024-10-15 DIAGNOSIS — N94.89 ENDOMETRIAL MASS: ICD-10-CM

## 2024-10-15 LAB
CYTOLOGY CMNT CVX/VAG CYTO-IMP: NORMAL
HPV HR 12 DNA GENITAL QL NAA+PROBE: NEGATIVE
HPV HR GENOTYPES PNL CVX NAA+PROBE: NEGATIVE
HPV16 DNA SPEC QL NAA+PROBE: NEGATIVE
HPV18 DNA SPEC QL NAA+PROBE: NEGATIVE
LAB AP HPV GENOTYPE QUESTION: YES
LAB AP HPV HR: NORMAL
LABORATORY COMMENT REPORT: NORMAL
LABORATORY COMMENT REPORT: NORMAL
LMP START DATE: NORMAL
PATH REPORT.TOTAL CANCER: NORMAL

## 2024-10-15 PROCEDURE — 76856 US EXAM PELVIC COMPLETE: CPT | Performed by: STUDENT IN AN ORGANIZED HEALTH CARE EDUCATION/TRAINING PROGRAM

## 2024-10-15 PROCEDURE — 76830 TRANSVAGINAL US NON-OB: CPT

## 2024-10-15 PROCEDURE — 76830 TRANSVAGINAL US NON-OB: CPT | Performed by: STUDENT IN AN ORGANIZED HEALTH CARE EDUCATION/TRAINING PROGRAM

## 2024-10-16 ASSESSMENT — ENCOUNTER SYMPTOMS
WEIGHT LOSS: 0
DIARRHEA: 0
HEADACHES: 0
HEMATOCHEZIA: 0
ANOREXIA: 0
ABDOMINAL PAIN: 1
HEMATURIA: 0
FEVER: 0
ARTHRALGIAS: 0
BELCHING: 0
FLATUS: 0
MYALGIAS: 1
NAUSEA: 0
VOMITING: 0
DYSURIA: 0
CONSTIPATION: 0
FREQUENCY: 0

## 2024-10-16 NOTE — PROGRESS NOTES
Patient ID: Aminta Alba is a 49 y.o. female.    Endometrial biopsy    Date/Time: 11/14/2024 9:03 AM    Performed by: Anne Marie Dixon MD  Authorized by: Anne Marie Dixon MD    Consent:     Consent obtained: written    Consent given by: patient    Risks discussed: bleeding, infection and pain    Alternatives discussed: observation  Indications:     Indications: thickened endometrium    Procedure:     A bimanual exam was performed: yes      Uterus size: 6-8 weeks    Uterus position: anteverted    Prepped with: Betadine    Tenaculum used: yes      A local block was performed: no      Cervix dilated: no      Number of passes: 1  Findings:     Cervix: normal      Uterus depth by sound (cm): 8    Specimen collected: specimen collected and sent to pathology      Patient tolerance: tolerated well, no immediate complications  Subjective   Patient ID: Aminta Alba is a 49 y.o. female who presents for Endometrial Biopsy .  10/9/2024 documentation:  She presents as a new patient for gyn visit. She was referred by Dr. Olsen for pelvic pain. Last two progress notes were reviewed in preparation for today's visit.   8/12/2020 pap and HPV returned negative. She desires annual exam along  with evaluation of pelvic pain and imaging.    She has had complaint of a deep pain in the left hip/pelvis since July 2024. She describes this as a deep aching similar to what she has associated with menses. This pain comes and goes and can be sharp at times. There is no associated bowel or bladder issues. This pain can be exacerbated by stretching and seems to be worse when getting up from bed. She had pain with bending, sitting, activity. The pain reminded her of pain/cramping with menses but was clearly related to activity. The initial pain lasted for one month.  Initial thought was that this could be musculoskeletal in origin. She was prescribed a course of muscle relaxer and prednisone with no improvement in the pain. The pain has  gradually improved since July but she still feels something is not right. There continues to be a dull discomfort and sensation of something pressing on her hip/pelvis. It is not as related to activity now.    Menses are monthly with heavy flow and with clots. Tranexamic acid has helped with menses. She has not noted any relation of pain with menses. She denies any recent injury or fall. She denies pain with intercourse. She denies any relation of pain to using the bathroom or eating.     Pelvic ultrasound was performed on 8/21/2024 and was significant for an endometrial mass and a 2.2 cm left adnexal complex cyst:  UTERUS:  The uterus measures  6.8 x 5.7 x 9.8. Normal myometrium.  ENDOMETRIUM:  Endometrial thickness 1.2 cm. Endometrial hypoechoic nodule slightly disrupting the endometrial stripe measuring 1.1 cm.  RIGHT ADNEXA:  The right ovary measures 1.6 x 2.0 x 2.4 and demonstrates normal flow. No gross right adnexal masses are seen, no hydrosalpinx.  LEFT ADNEXA:  The left ovary measures 3.0 x 1.6 x 2.9 and demonstrates normal flow. Complex follicle/corpus luteum measuring 2.2 cm    CT was performed on 9/21/2024:  IMPRESSION:  1. No acute abdominopelvic pathology.  2. Redemonstration of rounded likely either submucosal fibroid versus polyp extending superiorly from the inferior aspect of the myometrium into the endometrial cavity. Similar uterine and bilateral adnexal heterogeneity with multiple adnexal cysts which, all of which are better appreciated on recent ultrasound of the pelvis from 08/21/2024.    Within the body of the CT report the radiologist also made note of dilated pelvic vasculature.    Xray of the hip was performed on 9/18/2024 and returned unremarkable.   CBC returned without anemia. CA-125 returned normal at 12.0.    11/14/2024:  At last visit she declined endometrial biopsy but is willling to perform this today.   Follow up pelvic ultrasound was performed on 10/16/2024. This shows resolution  of the adnexal cyst with normal appearance of both ovaries. The uterus was also unchanged with note of a stable appearing 1.2 cm hyperechoic nodule and a 1.1 cm endometrial thickness.       Abdominal Pain  This is a chronic problem. The current episode started more than 1 month ago. The onset quality is sudden. The problem occurs daily. The most recent episode lasted 4 months. The problem has been waxing and waning. The pain is located in the left flank. The pain is at a severity of 3/10. The quality of the pain is aching, dull and a sensation of fullness. The abdominal pain radiates to the pelvis. Associated symptoms include myalgias. Pertinent negatives include no anorexia, arthralgias, belching, constipation, diarrhea, dysuria, fever, flatus, frequency, headaches, hematochezia, hematuria, melena, nausea, vomiting or weight loss. The pain is aggravated by being still, certain positions and movement. The pain is relieved by Activity and movement. Prior diagnostic workup includes CT scan and ultrasound.         Review of Systems   Constitutional:  Negative for fever and weight loss.   Gastrointestinal:  Positive for abdominal pain. Negative for anorexia, constipation, diarrhea, flatus, hematochezia, melena, nausea and vomiting.   Genitourinary:  Negative for dysuria, frequency and hematuria.   Musculoskeletal:  Positive for myalgias. Negative for arthralgias.   Neurological:  Negative for headaches.       Past Medical History:   Diagnosis Date    Anemia     Malignant neoplasm of ascending colon (Multi) 02/06/2017    Malignant neoplasm of ascending colon      Past Surgical History:   Procedure Laterality Date    APPENDECTOMY  02/02/2015    Appendectomy    DILATION AND CURETTAGE OF UTERUS  02/02/2015    Dilation And Curettage    OTHER SURGICAL HISTORY  04/13/2016    Laparo Part Colectomy W/ Removal Terminal Ileum, Ileocolost    TONSILLECTOMY  02/02/2015    Tonsillectomy      No Known Allergies   Current Outpatient  Medications on File Prior to Visit   Medication Sig Dispense Refill    cetirizine (ZyrTEC) 10 mg tablet Take 1 tablet (10 mg) by mouth once daily.      [DISCONTINUED] meloxicam (Mobic) 15 mg tablet Take 1 tablet (15 mg) by mouth once daily. 30 tablet 11    [DISCONTINUED] tiZANidine (Zanaflex) 4 mg tablet Take 1 tablet (4 mg) by mouth once daily at bedtime. 30 tablet 2     No current facility-administered medications on file prior to visit.        Objective   Physical Exam  Constitutional:       Appearance: Normal appearance.   Pulmonary:      Effort: Pulmonary effort is normal.   Abdominal:      Palpations: Abdomen is soft.   Genitourinary:     General: Normal vulva.      Exam position: Lithotomy position.      Labia:         Right: No lesion.         Left: No lesion.       Urethra: No urethral lesion.      Vagina: Normal. No lesions.      Cervix: No friability or lesion.      Uterus: Normal. Not enlarged and not tender.       Adnexa: Right adnexa normal and left adnexa normal.        Right: No mass or tenderness.          Left: No mass or tenderness.     Skin:     General: Skin is warm and dry.   Neurological:      Mental Status: She is alert.           Problem List Items Addressed This Visit       Pelvic pain in female    Overview     Left deep pelvic pain since 7/2024.   Pelvic ultrasound 8/21/2024 returned with normal sized uterus without evidence of fibroids, endometrial thickness 1.2 cm and endometrial hypoechoic nodule slightly disrupting the endometrial stripe measuring 1.1 cm. Right ovary had normal appearance and left ovary had a complex follicle/corpus luteum measuring 2.2 cm. Follow up ultrasound 10/16/2024 shows resolution of adnexal cyst and continued presence of endometrial nodule.  CT 9/10/2024 was significant for dilated pelvic vasculature.  Pelvic pain is not reproduced with palpation of pelvic floor muscles or bimanual exam.             Current Assessment & Plan     She feels this pain is becoming  less frequent. We discussed potential for pelvic congestion syndrome but this is less likely given lack of relation of pain to menses.          Menorrhagia with regular cycle - Primary    Overview     Heavy menses with clots along with endometrial mass on imaging.  She agrees to endometrial biopsy at follow up.         Endometrial mass    Overview     Ultrasound was performed 8/12/2024 due to left pelvic pain. Uterus measured 6.8 x 5.7 x 9.8 cm with 1.2 cm endometrial thickness and a 1.1 cm hypoechoic endometrial lesion noted.  Endometrial biopsy is planned for at follow up visit.   10/16/2024 ultrasound shows unchanged appearance of endometrium measuring 1.1 cm with 1.2 cm echogenic lesion noted.   11/14/2024 endometrial biopsy is performed.          Current Assessment & Plan     If biopsy returns benign we will continue to observe with no intervention.          Complex cyst of uterine adnexa    Overview     8/21/2024 ultrasound shows a left complex follicle/corpus luteum measuring 2.2 cm. CA-125 returned normal at 12.   10/16/2024 ultrasound shows normal appearing right and left ovary without mass or concerning cyst.          Current Assessment & Plan     Adnexal cyst has resolved. No further follow up is needed.

## 2024-10-28 ENCOUNTER — APPOINTMENT (OUTPATIENT)
Dept: PRIMARY CARE | Facility: CLINIC | Age: 49
End: 2024-10-28
Payer: COMMERCIAL

## 2024-10-28 VITALS
SYSTOLIC BLOOD PRESSURE: 108 MMHG | HEART RATE: 69 BPM | WEIGHT: 135 LBS | HEIGHT: 63 IN | BODY MASS INDEX: 23.92 KG/M2 | DIASTOLIC BLOOD PRESSURE: 70 MMHG | OXYGEN SATURATION: 99 %

## 2024-10-28 DIAGNOSIS — Z01.419 WELL WOMAN EXAM WITH ROUTINE GYNECOLOGICAL EXAM: Primary | ICD-10-CM

## 2024-10-28 DIAGNOSIS — N76.0 ACUTE VAGINITIS: ICD-10-CM

## 2024-10-28 PROCEDURE — 90656 IIV3 VACC NO PRSV 0.5 ML IM: CPT | Performed by: FAMILY MEDICINE

## 2024-10-28 PROCEDURE — 1036F TOBACCO NON-USER: CPT | Performed by: FAMILY MEDICINE

## 2024-10-28 PROCEDURE — 3008F BODY MASS INDEX DOCD: CPT | Performed by: FAMILY MEDICINE

## 2024-10-28 PROCEDURE — 90471 IMMUNIZATION ADMIN: CPT | Performed by: FAMILY MEDICINE

## 2024-10-28 PROCEDURE — 99396 PREV VISIT EST AGE 40-64: CPT | Performed by: FAMILY MEDICINE

## 2024-10-28 RX ORDER — FLUCONAZOLE 150 MG/1
150 TABLET ORAL ONCE
Qty: 1 TABLET | Refills: 0 | Status: SHIPPED | OUTPATIENT
Start: 2024-10-28 | End: 2024-10-28

## 2024-10-28 ASSESSMENT — ENCOUNTER SYMPTOMS
DEPRESSION: 0
OCCASIONAL FEELINGS OF UNSTEADINESS: 0
LOSS OF SENSATION IN FEET: 0

## 2024-10-28 ASSESSMENT — PATIENT HEALTH QUESTIONNAIRE - PHQ9
2. FEELING DOWN, DEPRESSED OR HOPELESS: NOT AT ALL
1. LITTLE INTEREST OR PLEASURE IN DOING THINGS: NOT AT ALL
SUM OF ALL RESPONSES TO PHQ9 QUESTIONS 1 AND 2: 0

## 2024-11-14 ENCOUNTER — APPOINTMENT (OUTPATIENT)
Dept: OBSTETRICS AND GYNECOLOGY | Facility: CLINIC | Age: 49
End: 2024-11-14
Payer: COMMERCIAL

## 2024-11-14 VITALS — BODY MASS INDEX: 23.91 KG/M2 | DIASTOLIC BLOOD PRESSURE: 62 MMHG | SYSTOLIC BLOOD PRESSURE: 100 MMHG | WEIGHT: 135 LBS

## 2024-11-14 DIAGNOSIS — N94.89 ENDOMETRIAL MASS: ICD-10-CM

## 2024-11-14 DIAGNOSIS — N92.0 MENORRHAGIA WITH REGULAR CYCLE: Primary | ICD-10-CM

## 2024-11-14 DIAGNOSIS — N83.8 COMPLEX CYST OF UTERINE ADNEXA: ICD-10-CM

## 2024-11-14 DIAGNOSIS — R10.2 PELVIC PAIN IN FEMALE: ICD-10-CM

## 2024-11-14 PROCEDURE — 58100 BIOPSY OF UTERUS LINING: CPT | Performed by: OBSTETRICS & GYNECOLOGY

## 2024-11-14 PROCEDURE — 99214 OFFICE O/P EST MOD 30 MIN: CPT | Performed by: OBSTETRICS & GYNECOLOGY

## 2024-11-14 NOTE — LETTER
November 14, 2024     Ashlyn Olsen DO  [address] [facility]  [facility] [address]    Patient: Aminta Alba   YOB: 1975   Date of Visit: 11/14/2024       Dear Dr. Ashlyn Olsen, DO:    Thank you for referring Aminta Alba to me for evaluation. Below are my notes for this consultation.  If you have questions, please do not hesitate to call me. I look forward to following your patient along with you.       Sincerely,     Anne Marie Dixon MD      CC: No Recipients  ______________________________________________________________________________________    Patient ID: Aminta Alba is a 49 y.o. female.    Endometrial biopsy    Date/Time: 11/14/2024 9:03 AM    Performed by: Anne Marie Dixon MD  Authorized by: Anne Marie Dixon MD    Consent:     Consent obtained: written    Consent given by: patient    Risks discussed: bleeding, infection and pain    Alternatives discussed: observation  Indications:     Indications: thickened endometrium    Procedure:     A bimanual exam was performed: yes      Uterus size: 6-8 weeks    Uterus position: anteverted    Prepped with: Betadine    Tenaculum used: yes      A local block was performed: no      Cervix dilated: no      Number of passes: 1  Findings:     Cervix: normal      Uterus depth by sound (cm): 8    Specimen collected: specimen collected and sent to pathology      Patient tolerance: tolerated well, no immediate complications  Subjective  Patient ID: Aminta Alba is a 49 y.o. female who presents for Endometrial Biopsy .  10/9/2024 documentation:  She presents as a new patient for gyn visit. She was referred by Dr. Olsen for pelvic pain. Last two progress notes were reviewed in preparation for today's visit.   8/12/2020 pap and HPV returned negative. She desires annual exam along  with evaluation of pelvic pain and imaging.    She has had complaint of a deep pain in the left hip/pelvis since July 2024. She describes  this as a deep aching similar to what she has associated with menses. This pain comes and goes and can be sharp at times. There is no associated bowel or bladder issues. This pain can be exacerbated by stretching and seems to be worse when getting up from bed. She had pain with bending, sitting, activity. The pain reminded her of pain/cramping with menses but was clearly related to activity. The initial pain lasted for one month.  Initial thought was that this could be musculoskeletal in origin. She was prescribed a course of muscle relaxer and prednisone with no improvement in the pain. The pain has gradually improved since July but she still feels something is not right. There continues to be a dull discomfort and sensation of something pressing on her hip/pelvis. It is not as related to activity now.    Menses are monthly with heavy flow and with clots. Tranexamic acid has helped with menses. She has not noted any relation of pain with menses. She denies any recent injury or fall. She denies pain with intercourse. She denies any relation of pain to using the bathroom or eating.     Pelvic ultrasound was performed on 8/21/2024 and was significant for an endometrial mass and a 2.2 cm left adnexal complex cyst:  UTERUS:  The uterus measures  6.8 x 5.7 x 9.8. Normal myometrium.  ENDOMETRIUM:  Endometrial thickness 1.2 cm. Endometrial hypoechoic nodule slightly disrupting the endometrial stripe measuring 1.1 cm.  RIGHT ADNEXA:  The right ovary measures 1.6 x 2.0 x 2.4 and demonstrates normal flow. No gross right adnexal masses are seen, no hydrosalpinx.  LEFT ADNEXA:  The left ovary measures 3.0 x 1.6 x 2.9 and demonstrates normal flow. Complex follicle/corpus luteum measuring 2.2 cm    CT was performed on 9/21/2024:  IMPRESSION:  1. No acute abdominopelvic pathology.  2. Redemonstration of rounded likely either submucosal fibroid versus polyp extending superiorly from the inferior aspect of the myometrium into the  endometrial cavity. Similar uterine and bilateral adnexal heterogeneity with multiple adnexal cysts which, all of which are better appreciated on recent ultrasound of the pelvis from 08/21/2024.    Within the body of the CT report the radiologist also made note of dilated pelvic vasculature.    Xray of the hip was performed on 9/18/2024 and returned unremarkable.   CBC returned without anemia. CA-125 returned normal at 12.0.    11/14/2024:  At last visit she declined endometrial biopsy but is willling to perform this today.   Follow up pelvic ultrasound was performed on 10/16/2024. This shows resolution of the adnexal cyst with normal appearance of both ovaries. The uterus was also unchanged with note of a stable appearing 1.2 cm hyperechoic nodule and a 1.1 cm endometrial thickness.       Abdominal Pain  This is a chronic problem. The current episode started more than 1 month ago. The onset quality is sudden. The problem occurs daily. The most recent episode lasted 4 months. The problem has been waxing and waning. The pain is located in the left flank. The pain is at a severity of 3/10. The quality of the pain is aching, dull and a sensation of fullness. The abdominal pain radiates to the pelvis. Associated symptoms include myalgias. Pertinent negatives include no anorexia, arthralgias, belching, constipation, diarrhea, dysuria, fever, flatus, frequency, headaches, hematochezia, hematuria, melena, nausea, vomiting or weight loss. The pain is aggravated by being still, certain positions and movement. The pain is relieved by Activity and movement. Prior diagnostic workup includes CT scan and ultrasound.         Review of Systems   Constitutional:  Negative for fever and weight loss.   Gastrointestinal:  Positive for abdominal pain. Negative for anorexia, constipation, diarrhea, flatus, hematochezia, melena, nausea and vomiting.   Genitourinary:  Negative for dysuria, frequency and hematuria.   Musculoskeletal:   Positive for myalgias. Negative for arthralgias.   Neurological:  Negative for headaches.       Past Medical History:   Diagnosis Date   • Anemia    • Malignant neoplasm of ascending colon (Multi) 02/06/2017    Malignant neoplasm of ascending colon      Past Surgical History:   Procedure Laterality Date   • APPENDECTOMY  02/02/2015    Appendectomy   • DILATION AND CURETTAGE OF UTERUS  02/02/2015    Dilation And Curettage   • OTHER SURGICAL HISTORY  04/13/2016    Laparo Part Colectomy W/ Removal Terminal Ileum, Ileocolost   • TONSILLECTOMY  02/02/2015    Tonsillectomy      No Known Allergies   Current Outpatient Medications on File Prior to Visit   Medication Sig Dispense Refill   • cetirizine (ZyrTEC) 10 mg tablet Take 1 tablet (10 mg) by mouth once daily.     • [DISCONTINUED] meloxicam (Mobic) 15 mg tablet Take 1 tablet (15 mg) by mouth once daily. 30 tablet 11   • [DISCONTINUED] tiZANidine (Zanaflex) 4 mg tablet Take 1 tablet (4 mg) by mouth once daily at bedtime. 30 tablet 2     No current facility-administered medications on file prior to visit.        Objective  Physical Exam  Constitutional:       Appearance: Normal appearance.   Pulmonary:      Effort: Pulmonary effort is normal.   Abdominal:      Palpations: Abdomen is soft.   Genitourinary:     General: Normal vulva.      Exam position: Lithotomy position.      Labia:         Right: No lesion.         Left: No lesion.       Urethra: No urethral lesion.      Vagina: Normal. No lesions.      Cervix: No friability or lesion.      Uterus: Normal. Not enlarged and not tender.       Adnexa: Right adnexa normal and left adnexa normal.        Right: No mass or tenderness.          Left: No mass or tenderness.     Skin:     General: Skin is warm and dry.   Neurological:      Mental Status: She is alert.           Problem List Items Addressed This Visit       Pelvic pain in female    Overview     Left deep pelvic pain since 7/2024.   Pelvic ultrasound 8/21/2024  returned with normal sized uterus without evidence of fibroids, endometrial thickness 1.2 cm and endometrial hypoechoic nodule slightly disrupting the endometrial stripe measuring 1.1 cm. Right ovary had normal appearance and left ovary had a complex follicle/corpus luteum measuring 2.2 cm. Follow up ultrasound 10/16/2024 shows resolution of adnexal cyst and continued presence of endometrial nodule.  CT 9/10/2024 was significant for dilated pelvic vasculature.  Pelvic pain is not reproduced with palpation of pelvic floor muscles or bimanual exam.             Current Assessment & Plan     She feels this pain is becoming less frequent. We discussed potential for pelvic congestion syndrome but this is less likely given lack of relation of pain to menses.          Menorrhagia with regular cycle - Primary    Overview     Heavy menses with clots along with endometrial mass on imaging.  She agrees to endometrial biopsy at follow up.         Endometrial mass    Overview     Ultrasound was performed 8/12/2024 due to left pelvic pain. Uterus measured 6.8 x 5.7 x 9.8 cm with 1.2 cm endometrial thickness and a 1.1 cm hypoechoic endometrial lesion noted.  Endometrial biopsy is planned for at follow up visit.   10/16/2024 ultrasound shows unchanged appearance of endometrium measuring 1.1 cm with 1.2 cm echogenic lesion noted.   11/14/2024 endometrial biopsy is performed.          Current Assessment & Plan     If biopsy returns benign we will continue to observe with no intervention.          Complex cyst of uterine adnexa    Overview     8/21/2024 ultrasound shows a left complex follicle/corpus luteum measuring 2.2 cm. CA-125 returned normal at 12.   10/16/2024 ultrasound shows normal appearing right and left ovary without mass or concerning cyst.          Current Assessment & Plan     Adnexal cyst has resolved. No further follow up is needed.

## 2024-11-14 NOTE — ASSESSMENT & PLAN NOTE
She feels this pain is becoming less frequent. We discussed potential for pelvic congestion syndrome but this is less likely given lack of relation of pain to menses.

## 2024-11-21 ENCOUNTER — TELEPHONE (OUTPATIENT)
Dept: OBSTETRICS AND GYNECOLOGY | Facility: CLINIC | Age: 49
End: 2024-11-21
Payer: COMMERCIAL

## 2024-11-21 LAB
LABORATORY COMMENT REPORT: NORMAL
PATH REPORT.FINAL DX SPEC: NORMAL
PATH REPORT.GROSS SPEC: NORMAL
PATH REPORT.RELEVANT HX SPEC: NORMAL
PATH REPORT.TOTAL CANCER: NORMAL
RESIDENT REVIEW: NORMAL

## 2024-11-21 NOTE — TELEPHONE ENCOUNTER
----- Message from Anne Marie Dixon sent at 11/21/2024  3:24 PM EST -----  Endometrial pathology returned benign but with polyp. This is consistent with ultrasound findings of endometrial lesion. I recommend we proceed with dilation and curettage to remove the polyp.

## 2024-11-22 ENCOUNTER — PREP FOR PROCEDURE (OUTPATIENT)
Dept: OBSTETRICS AND GYNECOLOGY | Facility: CLINIC | Age: 49
End: 2024-11-22
Payer: COMMERCIAL

## 2024-11-22 DIAGNOSIS — N84.0 ENDOMETRIAL POLYP: Primary | ICD-10-CM

## 2024-11-22 RX ORDER — ACETAMINOPHEN 325 MG/1
975 TABLET ORAL ONCE
OUTPATIENT
Start: 2024-11-22 | End: 2024-11-22

## 2024-11-29 ASSESSMENT — ENCOUNTER SYMPTOMS
MYALGIAS: 1
FREQUENCY: 0
DIARRHEA: 0
ARTHRALGIAS: 0
ABDOMINAL PAIN: 1
FLATUS: 0
BELCHING: 0
CONSTIPATION: 0
NAUSEA: 0
WEIGHT LOSS: 0
ANOREXIA: 0
HEMATURIA: 0
VOMITING: 0
HEADACHES: 0
HEMATOCHEZIA: 0
DYSURIA: 0
FEVER: 0

## 2024-11-30 NOTE — PROGRESS NOTES
Subjective   Patient ID: Aminta Alba is a 49 y.o. female who presents for Pre-op Visit.  10/9/2024 documentation:  She presents as a new patient for gyn visit. She was referred by Dr. Olsen for pelvic pain. Last two progress notes were reviewed in preparation for today's visit.   8/12/2020 pap and HPV returned negative. She desires annual exam along  with evaluation of pelvic pain and imaging.    She has had complaint of a deep pain in the left hip/pelvis since July 2024. She describes this as a deep aching similar to what she has associated with menses. This pain comes and goes and can be sharp at times. There is no associated bowel or bladder issues. This pain can be exacerbated by stretching and seems to be worse when getting up from bed. She had pain with bending, sitting, activity. The pain reminded her of pain/cramping with menses but was clearly related to activity. The initial pain lasted for one month.  Initial thought was that this could be musculoskeletal in origin. She was prescribed a course of muscle relaxer and prednisone with no improvement in the pain. The pain has gradually improved since July but she still feels something is not right. There continues to be a dull discomfort and sensation of something pressing on her hip/pelvis. It is not as related to activity now.    Menses are monthly with heavy flow and with clots. Tranexamic acid has helped with menses. She has not noted any relation of pain with menses. She denies any recent injury or fall. She denies pain with intercourse. She denies any relation of pain to using the bathroom or eating.     Pelvic ultrasound was performed on 8/21/2024 and was significant for an endometrial mass and a 2.2 cm left adnexal complex cyst:  UTERUS:  The uterus measures  6.8 x 5.7 x 9.8. Normal myometrium.  ENDOMETRIUM:  Endometrial thickness 1.2 cm. Endometrial hypoechoic nodule slightly disrupting the endometrial stripe measuring 1.1 cm.  RIGHT ADNEXA:  The  right ovary measures 1.6 x 2.0 x 2.4 and demonstrates normal flow. No gross right adnexal masses are seen, no hydrosalpinx.  LEFT ADNEXA:  The left ovary measures 3.0 x 1.6 x 2.9 and demonstrates normal flow. Complex follicle/corpus luteum measuring 2.2 cm    CT was performed on 9/21/2024:  IMPRESSION:  1. No acute abdominopelvic pathology.  2. Redemonstration of rounded likely either submucosal fibroid versus polyp extending superiorly from the inferior aspect of the myometrium into the endometrial cavity. Similar uterine and bilateral adnexal heterogeneity with multiple adnexal cysts which, all of which are better appreciated on recent ultrasound of the pelvis from 08/21/2024.    Within the body of the CT report the radiologist also made note of dilated pelvic vasculature.    Xray of the hip was performed on 9/18/2024 and returned unremarkable.   CBC returned without anemia. CA-125 returned normal at 12.0.    11/14/2024:  At last visit she declined endometrial biopsy but is willling to perform this today.   Follow up pelvic ultrasound was performed on 10/16/2024. This shows resolution of the adnexal cyst with normal appearance of both ovaries. The uterus was also unchanged with note of a stable appearing 1.2 cm hyperechoic nodule and a 1.1 cm endometrial thickness.     12/4/2024:  This visit was completed via telehealth. All issues as below were discussed and addressed but no physical exam was performed. If it was felt that the patient should be evaluated in the office then they were directed there. The patient verbally consented to the visit.   Endometrial biopsy from 11/14/2024 returned with proliferative endometrium and endometrial polyp. She is interested in pursuing hysteroscopy dilation and curettage to further evaluate the endometrium and remove any residual polyp.   Questions and expectations for surgery were reviewed.    Abdominal Pain  This is a chronic problem. The current episode started more than 1  month ago. The onset quality is sudden. The problem occurs daily. The most recent episode lasted 4 months. The problem has been waxing and waning. The pain is located in the left flank. The pain is at a severity of 3/10. The quality of the pain is aching, dull and a sensation of fullness. The abdominal pain radiates to the pelvis. Associated symptoms include myalgias. Pertinent negatives include no anorexia, arthralgias, belching, constipation, diarrhea, dysuria, fever, flatus, frequency, headaches, hematochezia, hematuria, melena, nausea, vomiting or weight loss. The pain is aggravated by being still, certain positions and movement. The pain is relieved by Activity and movement. Prior diagnostic workup includes CT scan and ultrasound.         Review of Systems   Constitutional:  Negative for activity change, fever and weight loss.   HENT:  Negative for congestion.    Respiratory:  Negative for apnea and cough.    Cardiovascular:  Negative for chest pain.   Gastrointestinal:  Positive for abdominal pain. Negative for anorexia, constipation, diarrhea, flatus, hematochezia, melena, nausea and vomiting.   Genitourinary:  Negative for dysuria, frequency, hematuria and vaginal pain.   Musculoskeletal:  Positive for myalgias. Negative for arthralgias and joint swelling.   Neurological:  Negative for dizziness and headaches.   Psychiatric/Behavioral:  Negative for agitation.        Past Medical History:   Diagnosis Date    Anemia     Malignant neoplasm of ascending colon (Multi) 02/06/2017    Malignant neoplasm of ascending colon      Past Surgical History:   Procedure Laterality Date    APPENDECTOMY  02/02/2015    Appendectomy    DILATION AND CURETTAGE OF UTERUS  02/02/2015    Dilation And Curettage    OTHER SURGICAL HISTORY  04/13/2016    Laparo Part Colectomy W/ Removal Terminal Ileum, Ileocolost    TONSILLECTOMY  02/02/2015    Tonsillectomy      No Known Allergies   Current Outpatient Medications on File Prior to Visit    Medication Sig Dispense Refill    cetirizine (ZyrTEC) 10 mg tablet Take 1 tablet (10 mg) by mouth once daily.       No current facility-administered medications on file prior to visit.        Objective   Physical Exam  Constitutional:       Appearance: Normal appearance.   Pulmonary:      Effort: Pulmonary effort is normal.   Neurological:      Mental Status: She is alert and oriented to person, place, and time.   Psychiatric:         Attention and Perception: Attention normal.         Mood and Affect: Mood and affect normal.         Speech: Speech normal.         Behavior: Behavior normal. Behavior is cooperative.           Problem List Items Addressed This Visit       Menorrhagia with regular cycle - Primary    Overview     Heavy menses with clots along with endometrial mass on imaging.  11/14/2024 endometrial biopsy returned with benign proliferative endometrium and polyp.         Endometrial mass    Overview     Ultrasound was performed 8/12/2024 due to left pelvic pain. Uterus measured 6.8 x 5.7 x 9.8 cm with 1.2 cm endometrial thickness and a 1.1 cm hypoechoic endometrial lesion noted.  Endometrial biopsy is planned for at follow up visit.   10/16/2024 ultrasound shows unchanged appearance of endometrium measuring 1.1 cm with 1.2 cm echogenic lesion noted.   11/14/2024 endometrial biopsy returned with benign proliferative endometrium and polyp.         Current Assessment & Plan     She desires to proceed with hysteroscopy dilation and curettage to further evaluate endometrium and remove polyp.  Surgical consent was discussed.  She understands that surgery includes the risks of bleeding, infection, damage to internal organs including the bowel, bladder, ureters, blood vessels, nerves. risks of anesthesia, postoperative complications including stroke, MI, and death.  She wishes to proceed despite these risks.

## 2024-11-30 NOTE — ASSESSMENT & PLAN NOTE
She desires to proceed with hysteroscopy dilation and curettage to further evaluate endometrium and remove polyp.  Surgical consent was discussed.  She understands that surgery includes the risks of bleeding, infection, damage to internal organs including the bowel, bladder, ureters, blood vessels, nerves. risks of anesthesia, postoperative complications including stroke, MI, and death.  She wishes to proceed despite these risks.    detailed exam

## 2024-12-04 ENCOUNTER — APPOINTMENT (OUTPATIENT)
Dept: OBSTETRICS AND GYNECOLOGY | Facility: CLINIC | Age: 49
End: 2024-12-04
Payer: COMMERCIAL

## 2024-12-04 VITALS — WEIGHT: 135 LBS | HEIGHT: 63 IN | BODY MASS INDEX: 23.92 KG/M2

## 2024-12-04 DIAGNOSIS — N94.89 ENDOMETRIAL MASS: ICD-10-CM

## 2024-12-04 DIAGNOSIS — N92.0 MENORRHAGIA WITH REGULAR CYCLE: Primary | ICD-10-CM

## 2024-12-04 PROCEDURE — 99213 OFFICE O/P EST LOW 20 MIN: CPT | Performed by: OBSTETRICS & GYNECOLOGY

## 2024-12-04 PROCEDURE — 1036F TOBACCO NON-USER: CPT | Performed by: OBSTETRICS & GYNECOLOGY

## 2024-12-04 PROCEDURE — 3008F BODY MASS INDEX DOCD: CPT | Performed by: OBSTETRICS & GYNECOLOGY

## 2024-12-04 ASSESSMENT — ENCOUNTER SYMPTOMS
AGITATION: 0
JOINT SWELLING: 0
ACTIVITY CHANGE: 0
COUGH: 0
APNEA: 0
DIZZINESS: 0

## 2024-12-09 ENCOUNTER — ANESTHESIA EVENT (OUTPATIENT)
Dept: OPERATING ROOM | Facility: HOSPITAL | Age: 49
End: 2024-12-09
Payer: COMMERCIAL

## 2024-12-10 ENCOUNTER — PHARMACY VISIT (OUTPATIENT)
Dept: PHARMACY | Facility: CLINIC | Age: 49
End: 2024-12-10
Payer: MEDICARE

## 2024-12-10 ENCOUNTER — HOSPITAL ENCOUNTER (OUTPATIENT)
Facility: HOSPITAL | Age: 49
Setting detail: OUTPATIENT SURGERY
Discharge: HOME | End: 2024-12-10
Attending: OBSTETRICS & GYNECOLOGY | Admitting: OBSTETRICS & GYNECOLOGY
Payer: COMMERCIAL

## 2024-12-10 ENCOUNTER — ANESTHESIA (OUTPATIENT)
Dept: OPERATING ROOM | Facility: HOSPITAL | Age: 49
End: 2024-12-10
Payer: COMMERCIAL

## 2024-12-10 VITALS
DIASTOLIC BLOOD PRESSURE: 86 MMHG | HEART RATE: 69 BPM | WEIGHT: 135 LBS | TEMPERATURE: 97.4 F | SYSTOLIC BLOOD PRESSURE: 131 MMHG | BODY MASS INDEX: 23.92 KG/M2 | OXYGEN SATURATION: 100 % | RESPIRATION RATE: 14 BRPM | HEIGHT: 63 IN

## 2024-12-10 DIAGNOSIS — N84.0 ENDOMETRIAL POLYP: ICD-10-CM

## 2024-12-10 DIAGNOSIS — N92.0 MENORRHAGIA WITH REGULAR CYCLE: Primary | ICD-10-CM

## 2024-12-10 LAB — PREGNANCY TEST URINE, POC: NEGATIVE

## 2024-12-10 PROCEDURE — 2500000004 HC RX 250 GENERAL PHARMACY W/ HCPCS (ALT 636 FOR OP/ED): Performed by: ANESTHESIOLOGY

## 2024-12-10 PROCEDURE — RXMED WILLOW AMBULATORY MEDICATION CHARGE

## 2024-12-10 PROCEDURE — 2500000004 HC RX 250 GENERAL PHARMACY W/ HCPCS (ALT 636 FOR OP/ED): Performed by: NURSE ANESTHETIST, CERTIFIED REGISTERED

## 2024-12-10 PROCEDURE — 2500000005 HC RX 250 GENERAL PHARMACY W/O HCPCS: Performed by: OBSTETRICS & GYNECOLOGY

## 2024-12-10 PROCEDURE — 7100000009 HC PHASE TWO TIME - INITIAL BASE CHARGE: Performed by: OBSTETRICS & GYNECOLOGY

## 2024-12-10 PROCEDURE — 7100000010 HC PHASE TWO TIME - EACH INCREMENTAL 1 MINUTE: Performed by: OBSTETRICS & GYNECOLOGY

## 2024-12-10 PROCEDURE — 3700000001 HC GENERAL ANESTHESIA TIME - INITIAL BASE CHARGE: Performed by: OBSTETRICS & GYNECOLOGY

## 2024-12-10 PROCEDURE — 3600000003 HC OR TIME - INITIAL BASE CHARGE - PROCEDURE LEVEL THREE: Performed by: OBSTETRICS & GYNECOLOGY

## 2024-12-10 PROCEDURE — 96372 THER/PROPH/DIAG INJ SC/IM: CPT | Performed by: NURSE ANESTHETIST, CERTIFIED REGISTERED

## 2024-12-10 PROCEDURE — 88305 TISSUE EXAM BY PATHOLOGIST: CPT | Mod: TC,PORLAB | Performed by: OBSTETRICS & GYNECOLOGY

## 2024-12-10 PROCEDURE — 81025 URINE PREGNANCY TEST: CPT | Performed by: ANESTHESIOLOGY

## 2024-12-10 PROCEDURE — 2500000001 HC RX 250 WO HCPCS SELF ADMINISTERED DRUGS (ALT 637 FOR MEDICARE OP): Performed by: OBSTETRICS & GYNECOLOGY

## 2024-12-10 PROCEDURE — 58558 HYSTEROSCOPY BIOPSY: CPT | Performed by: OBSTETRICS & GYNECOLOGY

## 2024-12-10 PROCEDURE — 7100000001 HC RECOVERY ROOM TIME - INITIAL BASE CHARGE: Performed by: OBSTETRICS & GYNECOLOGY

## 2024-12-10 PROCEDURE — 7100000002 HC RECOVERY ROOM TIME - EACH INCREMENTAL 1 MINUTE: Performed by: OBSTETRICS & GYNECOLOGY

## 2024-12-10 PROCEDURE — 3700000002 HC GENERAL ANESTHESIA TIME - EACH INCREMENTAL 1 MINUTE: Performed by: OBSTETRICS & GYNECOLOGY

## 2024-12-10 PROCEDURE — 3600000008 HC OR TIME - EACH INCREMENTAL 1 MINUTE - PROCEDURE LEVEL THREE: Performed by: OBSTETRICS & GYNECOLOGY

## 2024-12-10 RX ORDER — LIDOCAINE HYDROCHLORIDE 10 MG/ML
0.1 INJECTION, SOLUTION EPIDURAL; INFILTRATION; INTRACAUDAL; PERINEURAL ONCE
Status: DISCONTINUED | OUTPATIENT
Start: 2024-12-10 | End: 2024-12-10 | Stop reason: HOSPADM

## 2024-12-10 RX ORDER — DIPHENHYDRAMINE HYDROCHLORIDE 50 MG/ML
12.5 INJECTION INTRAMUSCULAR; INTRAVENOUS ONCE AS NEEDED
Status: DISCONTINUED | OUTPATIENT
Start: 2024-12-10 | End: 2024-12-10 | Stop reason: HOSPADM

## 2024-12-10 RX ORDER — SODIUM CHLORIDE, SODIUM LACTATE, POTASSIUM CHLORIDE, CALCIUM CHLORIDE 600; 310; 30; 20 MG/100ML; MG/100ML; MG/100ML; MG/100ML
100 INJECTION, SOLUTION INTRAVENOUS CONTINUOUS
Status: DISCONTINUED | OUTPATIENT
Start: 2024-12-10 | End: 2024-12-10 | Stop reason: HOSPADM

## 2024-12-10 RX ORDER — KETOROLAC TROMETHAMINE 30 MG/ML
INJECTION, SOLUTION INTRAMUSCULAR; INTRAVENOUS AS NEEDED
Status: DISCONTINUED | OUTPATIENT
Start: 2024-12-10 | End: 2024-12-10

## 2024-12-10 RX ORDER — FENTANYL CITRATE 50 UG/ML
INJECTION, SOLUTION INTRAMUSCULAR; INTRAVENOUS AS NEEDED
Status: DISCONTINUED | OUTPATIENT
Start: 2024-12-10 | End: 2024-12-10

## 2024-12-10 RX ORDER — LABETALOL HYDROCHLORIDE 5 MG/ML
5 INJECTION, SOLUTION INTRAVENOUS ONCE AS NEEDED
Status: DISCONTINUED | OUTPATIENT
Start: 2024-12-10 | End: 2024-12-10 | Stop reason: HOSPADM

## 2024-12-10 RX ORDER — WATER 1 ML/ML
IRRIGANT IRRIGATION AS NEEDED
Status: DISCONTINUED | OUTPATIENT
Start: 2024-12-10 | End: 2024-12-10 | Stop reason: HOSPADM

## 2024-12-10 RX ORDER — METOCLOPRAMIDE HYDROCHLORIDE 5 MG/ML
INJECTION INTRAMUSCULAR; INTRAVENOUS AS NEEDED
Status: DISCONTINUED | OUTPATIENT
Start: 2024-12-10 | End: 2024-12-10

## 2024-12-10 RX ORDER — DROPERIDOL 2.5 MG/ML
0.62 INJECTION, SOLUTION INTRAMUSCULAR; INTRAVENOUS ONCE AS NEEDED
Status: DISCONTINUED | OUTPATIENT
Start: 2024-12-10 | End: 2024-12-10 | Stop reason: HOSPADM

## 2024-12-10 RX ORDER — PROPOFOL 10 MG/ML
INJECTION, EMULSION INTRAVENOUS AS NEEDED
Status: DISCONTINUED | OUTPATIENT
Start: 2024-12-10 | End: 2024-12-10

## 2024-12-10 RX ORDER — LIDOCAINE HCL/PF 100 MG/5ML
SYRINGE (ML) INTRAVENOUS AS NEEDED
Status: DISCONTINUED | OUTPATIENT
Start: 2024-12-10 | End: 2024-12-10

## 2024-12-10 RX ORDER — IBUPROFEN 600 MG/1
600 TABLET ORAL EVERY 6 HOURS PRN
Qty: 30 TABLET | Refills: 0 | Status: SHIPPED | OUTPATIENT
Start: 2024-12-10 | End: 2024-12-20

## 2024-12-10 RX ORDER — MORPHINE SULFATE 2 MG/ML
2 INJECTION, SOLUTION INTRAMUSCULAR; INTRAVENOUS EVERY 5 MIN PRN
Status: DISCONTINUED | OUTPATIENT
Start: 2024-12-10 | End: 2024-12-10 | Stop reason: HOSPADM

## 2024-12-10 RX ORDER — ONDANSETRON HYDROCHLORIDE 2 MG/ML
INJECTION, SOLUTION INTRAVENOUS AS NEEDED
Status: DISCONTINUED | OUTPATIENT
Start: 2024-12-10 | End: 2024-12-10

## 2024-12-10 RX ORDER — ACETAMINOPHEN 325 MG/1
975 TABLET ORAL ONCE
Status: COMPLETED | OUTPATIENT
Start: 2024-12-10 | End: 2024-12-10

## 2024-12-10 RX ORDER — OXYCODONE AND ACETAMINOPHEN 5; 325 MG/1; MG/1
1 TABLET ORAL EVERY 4 HOURS PRN
Status: DISCONTINUED | OUTPATIENT
Start: 2024-12-10 | End: 2024-12-10 | Stop reason: HOSPADM

## 2024-12-10 RX ORDER — FAMOTIDINE 10 MG/ML
20 INJECTION INTRAVENOUS ONCE
Status: COMPLETED | OUTPATIENT
Start: 2024-12-10 | End: 2024-12-10

## 2024-12-10 RX ORDER — MIDAZOLAM HYDROCHLORIDE 1 MG/ML
INJECTION, SOLUTION INTRAMUSCULAR; INTRAVENOUS AS NEEDED
Status: DISCONTINUED | OUTPATIENT
Start: 2024-12-10 | End: 2024-12-10

## 2024-12-10 RX ORDER — HYDRALAZINE HYDROCHLORIDE 20 MG/ML
5 INJECTION INTRAMUSCULAR; INTRAVENOUS EVERY 30 MIN PRN
Status: DISCONTINUED | OUTPATIENT
Start: 2024-12-10 | End: 2024-12-10 | Stop reason: HOSPADM

## 2024-12-10 RX ORDER — ACETAMINOPHEN 325 MG/1
650 TABLET ORAL EVERY 6 HOURS PRN
Start: 2024-12-10 | End: 2024-12-20

## 2024-12-10 RX ORDER — ONDANSETRON HYDROCHLORIDE 2 MG/ML
4 INJECTION, SOLUTION INTRAVENOUS ONCE AS NEEDED
Status: DISCONTINUED | OUTPATIENT
Start: 2024-12-10 | End: 2024-12-10 | Stop reason: HOSPADM

## 2024-12-10 RX ORDER — MEPERIDINE HYDROCHLORIDE 25 MG/ML
12.5 INJECTION INTRAMUSCULAR; INTRAVENOUS; SUBCUTANEOUS EVERY 10 MIN PRN
Status: DISCONTINUED | OUTPATIENT
Start: 2024-12-10 | End: 2024-12-10 | Stop reason: HOSPADM

## 2024-12-10 RX ORDER — ALBUTEROL SULFATE 0.83 MG/ML
2.5 SOLUTION RESPIRATORY (INHALATION) ONCE AS NEEDED
Status: DISCONTINUED | OUTPATIENT
Start: 2024-12-10 | End: 2024-12-10 | Stop reason: HOSPADM

## 2024-12-10 SDOH — HEALTH STABILITY: MENTAL HEALTH: CURRENT SMOKER: 0

## 2024-12-10 ASSESSMENT — PAIN SCALES - GENERAL
PAINLEVEL_OUTOF10: 0 - NO PAIN
PAIN_LEVEL: 0
PAINLEVEL_OUTOF10: 0 - NO PAIN

## 2024-12-10 ASSESSMENT — COLUMBIA-SUICIDE SEVERITY RATING SCALE - C-SSRS
6. HAVE YOU EVER DONE ANYTHING, STARTED TO DO ANYTHING, OR PREPARED TO DO ANYTHING TO END YOUR LIFE?: NO
2. HAVE YOU ACTUALLY HAD ANY THOUGHTS OF KILLING YOURSELF?: NO
1. IN THE PAST MONTH, HAVE YOU WISHED YOU WERE DEAD OR WISHED YOU COULD GO TO SLEEP AND NOT WAKE UP?: NO

## 2024-12-10 ASSESSMENT — PAIN - FUNCTIONAL ASSESSMENT
PAIN_FUNCTIONAL_ASSESSMENT: 0-10
PAIN_FUNCTIONAL_ASSESSMENT: 0-10

## 2024-12-10 NOTE — ANESTHESIA POSTPROCEDURE EVALUATION
Patient: Aminta Alba    Procedure Summary       Date: 12/10/24 Room / Location: [facility] 01 / Virtual [facility]    Anesthesia Start: 1428 Anesthesia Stop:     Procedure: Hysteroscopy dilation and curettage - polypectomy (Pelvis) Diagnosis:       Endometrial polyp      (Endometrial polyp [N84.0])    Surgeons: Anne Marie Dixon MD Responsible Provider: JOCELYNN Ardon    Anesthesia Type: general ASA Status: 2            Anesthesia Type: general    Vitals Value Taken Time   /76 12/10/24 1512   Temp 98 12/10/24 1512   Pulse 73 12/10/24 1510   Resp 13 12/10/24 1510   SpO2 100 % 12/10/24 1510   Vitals shown include unfiled device data.    Anesthesia Post Evaluation    Patient location during evaluation: PACU  Patient participation: complete - patient participated  Level of consciousness: awake  Pain score: 0  Pain management: adequate  Airway patency: patent  Cardiovascular status: acceptable  Respiratory status: acceptable  Hydration status: acceptable  Postoperative Nausea and Vomiting: none      No notable events documented.

## 2024-12-10 NOTE — OP NOTE
Hysteroscopy dilation and curettage - polypectomy Operative Note     Date: 12/10/2024  OR Location: [facility]    Name: Aminta Alba, : 1975, Age: 49 y.o., MRN: [id number], Sex: female    Diagnosis  Pre-op Diagnosis      * Endometrial polyp [N84.0] Post-op Diagnosis     * Endometrial polyp [N84.0]     Procedures  Hysteroscopy dilation and curettage - polypectomy  13245 - [place] HYSTEROSCOPY BX ENDOMETRIUM&/POLYPC W/WO D&C      Surgeons      * Anne Marie Dixon - Primary    Resident/Fellow/Other Assistant:  Surgeons and Role:  * No surgeons found with a matching role *    Staff:   Circulator: Cici  Scrub Person: Fiorella  Circulator:   Tootieub Person:     Anesthesia Staff: CRNA: NORA Ardon-CRNA    Procedure Summary  Anesthesia: General  ASA: II  Estimated Blood Loss: 5mL  Intra-op Medications:   Administrations occurring from 1355 to 1510 on 12/10/24:   Medication Name Total Dose   sterile water irrigation solution 500 mL   dexAMETHasone (Decadron) injection 4 mg/mL 8 mg   fentaNYL (Sublimaze) injection 50 mcg/mL 100 mcg   ketorolac (Toradol) IM injection 60 mg/2 mL 30 mg   lidocaine PF (cardiac) syringe 2% 80 mg   metoclopramide (Reglan) injection 10 mg   midazolam (Versed) injection 1 mg/mL 2 mg   ondansetron (Zofran) 2 mg/mL injection 4 mg   propofol (Diprivan) injection 10 mg/mL 100 mg   NaCl 0.9 % bolus Cannot be calculated              Anesthesia Record               Intraprocedure I/O Totals       None           Specimen:   ID Type Source Tests Collected by Time   1 : ENDOMETRIUM CURETTINGS AND POLYP Tissue ENDOMETRIUM CURETTINGS SURGICAL PATHOLOGY EXAM Anne Marie Dixon MD 12/10/2024 2894                 Drains and/or Catheters: * None in log *    Tourniquet Times:         Implants:     Findings: Small submucosal fibroid on anterior uterus.    Indications: Aminta Alba is an 49 y.o. female who is having surgery for Endometrial polyp [N84.0].     The patient was seen in the preoperative  area. The risks, benefits, complications, treatment options, non-operative alternatives, expected recovery and outcomes were discussed with the patient. The possibilities of reaction to medication, pulmonary aspiration, injury to surrounding structures, bleeding, recurrent infection, the need for additional procedures, failure to diagnose a condition, and creating a complication requiring transfusion or operation were discussed with the patient. The patient concurred with the proposed plan, giving informed consent.  The site of surgery was properly noted/marked if necessary per policy. The patient has been actively warmed in preoperative area. Preoperative antibiotics are not indicated. Venous thrombosis prophylaxis have been ordered including bilateral sequential compression devices    Procedure Details: The patient was brought to the operating room where compression stockings were applied and adequate anesthesia was obtained. She was placed in lithotomy position and iodine vaginal and perineal prep was performed. A weighted speculum was placed in the vagina. The anterior lip of the cervix was grasped with a tenaculum. The cervix was progressively dilated using cervical dilators. The uterus was sounded. The hysteroscope was then inserted and intrauterine placement confirmed. The cervical canal, endometrial cavity and both tubal ossea were visualized. Anterior endometrial cavity was noted to have a round mass consistent with fibroid. Endometrium had a normal appearance overlying the fibroid. No clear polyp was visualized.  The hysteroscope was then removed. Sharp curettage was then performed with endometrial tissue obtained and sent to pathology. Polyp forceps were also used with small amount of tissue obtained. Texture of fibroid was noted during the curettage. The hysteroscope was reinserted and clear endometrial cavity confirmed with continued presence of small anterior fibroid. The hysteroscope was  removed.  Tenaculum was removed from the cervix. All instruments were removed from the vagina. The patient was taken in stable condition to recovery.    Complications:  None; patient tolerated the procedure well.    Disposition: PACU - hemodynamically stable.  Condition: stable                     Attending Attestation: I performed the procedure.    Anne Marie Dixon  Phone Number: [phone]

## 2024-12-10 NOTE — ANESTHESIA PREPROCEDURE EVALUATION
Patient: Aminta Alba    Procedure Information       Date/Time: 12/10/24 1330    Procedure: Hysteroscopy dilation and curettage - polypectomy (Pelvis) - Hysteroscopy dilation and curettage - polypectomy    [facility]: [facility] / [facility]    Surgeons: Anne Marie Dixon MD            Relevant Problems   Anesthesia (within normal limits)      GI   (+) Malignant neoplasm of colon, unspecified part of colon (Multi)      Liver   (+) Malignant neoplasm of colon, unspecified part of colon (Multi)      GYN   (+) Menorrhagia with regular cycle       Clinical information reviewed:   Tobacco  Allergies  Meds   Med Hx  Surg Hx   Fam Hx  Soc Hx        NPO Detail:  NPO/Void Status  Date of Last Liquid: 12/10/24  Time of Last Liquid: 0900  Date of Last Solid: 12/09/24  Time of Last Solid: 2000  Last Intake Type: Clear fluids  Time of Last Void: 1150         Physical Exam    Airway  Mallampati: II  TM distance: >3 FB  Neck ROM: full     Cardiovascular - normal exam     Dental - normal exam     Pulmonary - normal exam     Abdominal - normal exam           Anesthesia Plan    History of general anesthesia?: yes  History of complications of general anesthesia?: no    ASA 2     general     The patient is not a current smoker.    intravenous induction   Postoperative administration of opioids is intended.  Anesthetic plan and risks discussed with patient.    Plan discussed with CRNA.

## 2024-12-10 NOTE — INTERVAL H&P NOTE
H&P reviewed. The patient was examined and there are no changes to the H&P.  Heart RRR  Lungs are clear.

## 2024-12-10 NOTE — ANESTHESIA PROCEDURE NOTES
Airway  Date/Time: 12/10/2024 2:41 PM  Urgency: elective    Airway not difficult    Staffing  Performed: CRNA   Authorized by: JOCELYNN Ardon    Performed by: JOCELYNN Ardon  Patient location during procedure: OR    Indications and Patient Condition  Indications for airway management: anesthesia  Sedation level: deep  Preoxygenated: yes  Patient position: sniffing  Mask difficulty assessment: 1 - vent by mask    Final Airway Details  Final airway type: supraglottic airway      Successful airway: Supraglottic airway: igel.  Size 4     Number of attempts at approach: 1  Number of other approaches attempted: 0

## 2024-12-17 LAB
LABORATORY COMMENT REPORT: NORMAL
PATH REPORT.FINAL DX SPEC: NORMAL
PATH REPORT.GROSS SPEC: NORMAL
PATH REPORT.RELEVANT HX SPEC: NORMAL
PATH REPORT.TOTAL CANCER: NORMAL

## 2024-12-18 PROBLEM — Z48.89 POSTOPERATIVE VISIT: Status: ACTIVE | Noted: 2024-12-18

## 2024-12-25 ASSESSMENT — ENCOUNTER SYMPTOMS
ABDOMINAL PAIN: 1
CONSTIPATION: 0
FLATUS: 0
DYSURIA: 0
WEIGHT LOSS: 0
FREQUENCY: 0
HEADACHES: 0
ANOREXIA: 0
NAUSEA: 0
FEVER: 0
MYALGIAS: 1
BELCHING: 0
DIARRHEA: 0
HEMATURIA: 0
ARTHRALGIAS: 0
HEMATOCHEZIA: 0
VOMITING: 0

## 2024-12-25 NOTE — PROGRESS NOTES
Subjective   Patient ID: Aminta Alba is a 49 y.o. female who presents for Post-op Follow-up.  She presents for postoperative visit after hysteroscopy dilation and curettage on 12/10/2024. This visit was completed via telehealth. All issues as below were discussed and addressed but no physical exam was performed. If it was felt that the patient should be evaluated in the office then they were directed there. The patient verbally consented to the visit.    Menses have been monthly with heavy flow and with clots. Tranexamic acid has helped with menses.   Pelvic ultrasound was performed on 8/21/2024 and was significant for an endometrial mass and a 2.2 cm left adnexal complex cyst.  CA-125 returned normal at 12.0.  Follow up pelvic ultrasound was performed on 10/16/2024. This showed resolution of the adnexal cyst with normal appearance of both ovaries. The uterus was also unchanged with note of a stable appearing 1.2 cm hyperechoic nodule and a 1.1 cm endometrial thickness.     Endometrial biopsy from 11/14/2024 returned with proliferative endometrium and endometrial polyp.    Surgery was without complication and pathology returned benign. There was a submucosal fibroid noted at hysteroscopy. We reviewed plan for observation of bleeding with option of IUD or further treatment if needed. Questions were also answered about perimenopause. We reviewed plan for vitamin D level given mood swings.     Abdominal Pain  This is a chronic problem. The current episode started more than 1 month ago. The onset quality is sudden. The problem occurs daily. The most recent episode lasted 4 months. The problem has been waxing and waning. The pain is located in the left flank. The pain is at a severity of 3/10. The quality of the pain is aching, dull and a sensation of fullness. The abdominal pain radiates to the pelvis. Associated symptoms include myalgias. Pertinent negatives include no anorexia, arthralgias, belching, constipation,  diarrhea, dysuria, fever, flatus, frequency, headaches, hematochezia, hematuria, melena, nausea, vomiting or weight loss. The pain is aggravated by being still, certain positions and movement. The pain is relieved by Activity and movement. Prior diagnostic workup includes CT scan and ultrasound.         Review of Systems   Constitutional:  Negative for activity change, fever and weight loss.   HENT:  Negative for congestion.    Respiratory:  Negative for apnea and cough.    Cardiovascular:  Negative for chest pain.   Gastrointestinal:  Positive for abdominal pain. Negative for anorexia, constipation, diarrhea, flatus, hematochezia, melena, nausea and vomiting.   Genitourinary:  Negative for dysuria, frequency, hematuria and vaginal pain.   Musculoskeletal:  Positive for myalgias. Negative for arthralgias and joint swelling.   Neurological:  Negative for dizziness and headaches.   Psychiatric/Behavioral:  Negative for agitation.        Past Medical History:   Diagnosis Date    Anemia     Malignant neoplasm of ascending colon (Multi) 02/06/2017    Malignant neoplasm of ascending colon      Past Surgical History:   Procedure Laterality Date    APPENDECTOMY  02/02/2015    Appendectomy    DILATION AND CURETTAGE OF UTERUS  02/02/2015    Dilation And Curettage    OTHER SURGICAL HISTORY  04/13/2016    Laparo Part Colectomy W/ Removal Terminal Ileum, Ileocolost    TONSILLECTOMY  02/02/2015    Tonsillectomy      No Known Allergies   Current Outpatient Medications on File Prior to Visit   Medication Sig Dispense Refill    [DISCONTINUED] acetaminophen (Tylenol) 325 mg tablet Take 2 tablets (650 mg) by mouth every 6 hours if needed for mild pain (1 - 3) for up to 20 doses.      [DISCONTINUED] cetirizine (ZyrTEC) 10 mg tablet Take 1 tablet (10 mg) by mouth once daily.      [DISCONTINUED] ibuprofen 600 mg tablet Take 1 tablet (600 mg) by mouth every 6 hours if needed for moderate pain (4 - 6) for up to 30 doses. Take together with  acetaminophen. 30 tablet 0     No current facility-administered medications on file prior to visit.        Objective   Physical Exam  Constitutional:       Appearance: Normal appearance.   Pulmonary:      Effort: Pulmonary effort is normal.   Neurological:      Mental Status: She is alert and oriented to person, place, and time.   Psychiatric:         Attention and Perception: Attention normal.         Mood and Affect: Mood and affect normal.         Speech: Speech normal.         Behavior: Behavior normal. Behavior is cooperative.           Problem List Items Addressed This Visit       Submucous uterine fibroid    Overview     Ultrasound was performed 8/12/2024 due to left pelvic pain. Uterus measured 6.8 x 5.7 x 9.8 cm with 1.2 cm endometrial thickness and a 1.1 cm hypoechoic endometrial lesion noted.  Endometrial biopsy is planned for at follow up visit.   10/16/2024 ultrasound shows unchanged appearance of endometrium measuring 1.1 cm with 1.2 cm echogenic lesion noted.   11/14/2024 endometrial biopsy returned with benign proliferative endometrium and polyp.  12/10/2024 hysteroscopy notes submucosal fibroid. Pathology returned benign.          Postoperative visit - Primary    Overview     Endometrial biopsy returned with polyp. Hysteroscopy dilation and curettage was performed on 12/10/2024 with benign endometrium on pathology. Submucosal fibroid was visualized.         Current Assessment & Plan     She may return to normal activity. Follow up as needed.         Menorrhagia with regular cycle    Overview     Heavy menses with clots along with endometrial mass on imaging.  11/14/2024 endometrial biopsy returned with benign proliferative endometrium and polyp.         Relevant Orders    Vitamin D 25-Hydroxy,Total (for eval of Vitamin D levels)

## 2024-12-26 ENCOUNTER — APPOINTMENT (OUTPATIENT)
Dept: OBSTETRICS AND GYNECOLOGY | Facility: CLINIC | Age: 49
End: 2024-12-26
Payer: COMMERCIAL

## 2024-12-26 DIAGNOSIS — D25.0 SUBMUCOUS UTERINE FIBROID: ICD-10-CM

## 2024-12-26 DIAGNOSIS — Z48.89 POSTOPERATIVE VISIT: Primary | ICD-10-CM

## 2024-12-26 DIAGNOSIS — N92.0 MENORRHAGIA WITH REGULAR CYCLE: ICD-10-CM

## 2024-12-26 PROCEDURE — 1036F TOBACCO NON-USER: CPT | Performed by: OBSTETRICS & GYNECOLOGY

## 2024-12-26 PROCEDURE — 99024 POSTOP FOLLOW-UP VISIT: CPT | Performed by: OBSTETRICS & GYNECOLOGY

## 2024-12-26 ASSESSMENT — ENCOUNTER SYMPTOMS
JOINT SWELLING: 0
COUGH: 0
ACTIVITY CHANGE: 0
AGITATION: 0
DIZZINESS: 0
APNEA: 0

## 2025-02-05 ENCOUNTER — OFFICE VISIT (OUTPATIENT)
Dept: URGENT CARE | Age: 50
End: 2025-02-05
Payer: COMMERCIAL

## 2025-02-05 VITALS
TEMPERATURE: 98.4 F | SYSTOLIC BLOOD PRESSURE: 119 MMHG | HEART RATE: 85 BPM | DIASTOLIC BLOOD PRESSURE: 80 MMHG | OXYGEN SATURATION: 98 %

## 2025-02-05 DIAGNOSIS — J18.9 PNEUMONIA OF LEFT UPPER LOBE DUE TO INFECTIOUS ORGANISM: Primary | ICD-10-CM

## 2025-02-05 RX ORDER — ALBUTEROL SULFATE 90 UG/1
2 INHALANT RESPIRATORY (INHALATION) EVERY 4 HOURS PRN
Qty: 8.5 G | Refills: 0 | Status: SHIPPED | OUTPATIENT
Start: 2025-02-05 | End: 2026-02-05

## 2025-02-05 RX ORDER — BENZONATATE 200 MG/1
200 CAPSULE ORAL 3 TIMES DAILY PRN
Qty: 42 CAPSULE | Refills: 0 | Status: SHIPPED | OUTPATIENT
Start: 2025-02-05 | End: 2025-03-07

## 2025-02-05 RX ORDER — DOXYCYCLINE 100 MG/1
100 CAPSULE ORAL 2 TIMES DAILY
Qty: 20 CAPSULE | Refills: 0 | Status: SHIPPED | OUTPATIENT
Start: 2025-02-05 | End: 2025-02-15

## 2025-02-05 NOTE — PROGRESS NOTES
Subjective   Patient ID: Aminta Alba is a 49 y.o. female. They present today with a chief complaint of Cough (Cough, congestion, achy, HA, sinus drainage and mucus s/p 1/21).    History of Present Illness  50 yo female coming in for cough, congestion, headache, and sinus drainage for 2 weeks. She states she sometimes feels like she can't take a deep breath but does not feel like she is short of breath. She denies any fevers. She denies any other complaints.     Past Medical History  Allergies as of 02/05/2025    (No Known Allergies)       (Not in a hospital admission)       Past Medical History:   Diagnosis Date    Anemia     Malignant neoplasm of ascending colon (Multi) 02/06/2017    Malignant neoplasm of ascending colon       Past Surgical History:   Procedure Laterality Date    APPENDECTOMY  02/02/2015    Appendectomy    DILATION AND CURETTAGE OF UTERUS  02/02/2015    Dilation And Curettage    OTHER SURGICAL HISTORY  04/13/2016    Laparo Part Colectomy W/ Removal Terminal Ileum, Ileocolost    TONSILLECTOMY  02/02/2015    Tonsillectomy        reports that she has never smoked. She has never used smokeless tobacco. She reports current alcohol use of about 2.0 standard drinks of alcohol per week. She reports that she does not use drugs.    Review of Systems  Review of Systems:  General: No weight loss, fatigue, anorexia, insomnia, fever, chills.  ENT: No pharyngitis, dry mouth, positive nasal congestion and sinus drainage, no ear pain  Cardiac: No chest pain, palpitations, syncope, near syncope.  Pulmonary:  No shortness of breath,positive cough, no hemoptysis  Heme/lymph: No swollen glands, fever, bleeding  Musculoskeletal: No limb pain, joint pain, joint swelling.  Skin: No rashes  Neuro: No numbness, tingling, headaches                                 Objective    Vitals:    02/05/25 1150   BP: 119/80   BP Location: Left arm   Patient Position: Sitting   BP Cuff Size: Small adult   Pulse: 85   Temp: 36.9 °C  (98.4 °F)   TempSrc: Oral   SpO2: 98%     No LMP recorded.    Physical Exam  Physical Exam:  General: Vital noted, no distress. Afebrile  EENT: Eyes unremarkable, Pupils PERRLA, EOMs intact. TMs unremarkable. Posterior oropharynx unremarkable. Uvula in the midline and non-edematous. No PTA. No retropharyngeal mass. No Kameron's angina.  Cardiac: Regular rate and rhythm, no murmur  Pulmonary: Lungs with rhonchi and expiratory wheezing in the left upper lobe, remainder of lung fields are clear bilaterally with good aeration. Musculoskeletal: No peripheral edema.   Skin: No rashes    Procedures    Point of Care Test & Imaging Results from this visit  No results found for this visit on 02/05/25.   No results found.    Diagnostic study results (if any) were reviewed by THERESE Jacinto.    Assessment/Plan   Allergies, medications, history, and pertinent labs/EKGs/Imaging reviewed by THERESE Jacinto.     Medical Decision Making  Treatment: Doxycycline, tessalon, and albuterol prescribed  Differential: 1) uri, 2) sinusitis , 3) pneumonia  Plan: Patient will follow up with the PCP in the next 2-3 days. Return for any worsening symptoms or go to the ER for further evaluation. Patient understands return precautions and discharge insturctions.  Impression:   1) pneumonia      Orders and Diagnoses  Diagnoses and all orders for this visit:  Pneumonia of left upper lobe due to infectious organism  -     doxycycline (Vibramycin) 100 mg capsule; Take 1 capsule (100 mg) by mouth 2 times a day for 10 days. Take with at least 8 ounces (large glass) of water, do not lie down for 30 minutes after  -     benzonatate (Tessalon) 200 mg capsule; Take 1 capsule (200 mg) by mouth 3 times a day as needed for cough. Do not crush or chew.  -     albuterol (ProAir HFA) 90 mcg/actuation inhaler; Inhale 2 puffs every 4 hours if needed for wheezing or shortness of breath.      Medical Admin Record      Patient disposition:  Home    Electronically signed by THERESE Jacinto  12:13 PM

## 2025-03-21 ENCOUNTER — LAB (OUTPATIENT)
Dept: LAB | Facility: HOSPITAL | Age: 50
End: 2025-03-21
Payer: COMMERCIAL

## 2025-03-21 DIAGNOSIS — N84.0 ENDOMETRIAL POLYP: ICD-10-CM

## 2025-03-21 LAB
ERYTHROCYTE [DISTWIDTH] IN BLOOD BY AUTOMATED COUNT: 12.5 % (ref 11.5–14.5)
HCT VFR BLD AUTO: 38.5 % (ref 36–46)
HGB BLD-MCNC: 12.1 G/DL (ref 12–16)
MCH RBC QN AUTO: 30.4 PG (ref 26–34)
MCHC RBC AUTO-ENTMCNC: 31.4 G/DL (ref 32–36)
MCV RBC AUTO: 97 FL (ref 80–100)
NRBC BLD-RTO: 0 /100 WBCS (ref 0–0)
PLATELET # BLD AUTO: 249 X10*3/UL (ref 150–450)
RBC # BLD AUTO: 3.98 X10*6/UL (ref 4–5.2)
WBC # BLD AUTO: 4.4 X10*3/UL (ref 4.4–11.3)

## 2025-03-21 PROCEDURE — 85027 COMPLETE CBC AUTOMATED: CPT

## 2025-03-22 LAB — 25(OH)D3+25(OH)D2 SERPL-MCNC: 28 NG/ML (ref 30–100)

## (undated) DEVICE — DRAPE, UNDERBUTTOCKS, W/ 27IN FLUID POUCH

## (undated) DEVICE — TUBING, SUCTION, NON-CONDUCTIVE,W/MALE CONNECT, 6MM X 12 FT, STERILE

## (undated) DEVICE — COVER HANDLE LIGHT, STERIS, BLUE, STERILE

## (undated) DEVICE — BAG, PRESSURE INFUSER, 3000 CC, LF

## (undated) DEVICE — PREP TRAY, SKIN, DRY, W/GLOVES

## (undated) DEVICE — GOWN, ASTOUND, XL

## (undated) DEVICE — IRRIGATION SET, CYSTOSCOPY, REGULATING CLAMP, STRAIGHT, 81 IN

## (undated) DEVICE — CATHETER, RED RUBBER 16FR

## (undated) DEVICE — GLOVE, PROTEXIS PI CLASSIC, SZ-6.5, PF, LF

## (undated) DEVICE — Device

## (undated) DEVICE — TOWEL PACK, STERILE, 4/PACK, BLUE

## (undated) DEVICE — SOLUTION, IRRIGATION, USP, STERILE WATER, UROLOGICAL, 3000 ML, BAG